# Patient Record
Sex: FEMALE | Race: BLACK OR AFRICAN AMERICAN | Employment: PART TIME | URBAN - METROPOLITAN AREA
[De-identification: names, ages, dates, MRNs, and addresses within clinical notes are randomized per-mention and may not be internally consistent; named-entity substitution may affect disease eponyms.]

---

## 2018-01-11 ENCOUNTER — CLINICAL SUPPORT (OUTPATIENT)
Dept: FAMILY MEDICINE CLINIC | Age: 43
End: 2018-01-11

## 2018-01-11 DIAGNOSIS — E66.9 OBESITY, UNSPECIFIED CLASSIFICATION, UNSPECIFIED OBESITY TYPE, UNSPECIFIED WHETHER SERIOUS COMORBIDITY PRESENT: Primary | ICD-10-CM

## 2018-01-11 NOTE — PROGRESS NOTES
Patient attended a Medically Supervised Weight Loss New Patient Orientation today where we discussed:  - New Direction Very Low Calorie Diet details  - Medical Supervision  - Nutrition education  - Cost  - Policies and compliance required for program enrollment. Patients initial consultation with physician is tentatively scheduled for:  Future Appointments  Date Time Provider Nati Gtz   1/15/2018 8:30 AM AMA LAB AMA ROSALIO GORDON   1/18/2018 2:30 PM Amanda Collins DO [facility] starting weight was documented as: There were no vitals taken for this visit.     The following patient answers were pulled from Weight Loss Questionnaire regarding weight related illness: (refer to media section for full weight loss history)  Hypertension (high blood pressure)  no   High cholesterol no   Hypothyroidism no   Obstructive sleep apnea no   Gout no   Arthritis no ;    Heart Attack in the last 3 months: no     Type I Diabetes no   Type II Diabetes   no

## 2018-01-15 DIAGNOSIS — E66.9 OBESITY (BMI 30-39.9): Primary | ICD-10-CM

## 2018-01-18 ENCOUNTER — OFFICE VISIT (OUTPATIENT)
Dept: FAMILY MEDICINE CLINIC | Age: 43
End: 2018-01-18

## 2018-01-18 VITALS
WEIGHT: 260 LBS | TEMPERATURE: 96.6 F | DIASTOLIC BLOOD PRESSURE: 80 MMHG | SYSTOLIC BLOOD PRESSURE: 121 MMHG | RESPIRATION RATE: 17 BRPM | HEIGHT: 69 IN | HEART RATE: 95 BPM | BODY MASS INDEX: 38.51 KG/M2

## 2018-01-18 DIAGNOSIS — E66.9 OBESITY (BMI 30-39.9): Primary | ICD-10-CM

## 2018-01-18 DIAGNOSIS — K21.9 GASTROESOPHAGEAL REFLUX DISEASE WITHOUT ESOPHAGITIS: ICD-10-CM

## 2018-01-18 NOTE — PATIENT INSTRUCTIONS
Homework for [employer]        Exercise    Exercise daily   - Start slow, gradually increase your time by around 10 to 20 % a week    - To prevent injury, take a recovery week every 4 weeks (reduce your exercise time and intensity by 1/2 during this time)    - Your goal is to work up to 150 min a week; hard enough that you can't whistle or sing. It may take 6 months to work up to this. - Call the Health  at [facility] labs for exercise ideas ([phone])          Common Side Effects   (In order of how common)    -Fatigue  -Hunger  -Constipation  -Low sodium  -Hair Loss      1. Fatigue  Everyone goes through this stage  It's normal  It lasts 10 - 14 days  It goes away  It's your body adjusting to the Ketogenic diet and it's normal       2. Hunger  More common in the first few days  After your body adjusts to the Ketogenic diet it will go away       3. Constipation   -Drink at least 64 oz of non-calorie fluid a day  -Add fiber (at least 20 grams a day)     1. Get the New Direction products with fiber added     2. Use SUGAR-FREE products: Fiber One products, Benefiber or Metamucil    If you're prone to constipation: Take a Stool Softener every day.     (eg - Dulcolax Stool Softener 100 mg or Miralax)    If you get constipated:     1. Start with a Stool Softener (produces a bowel movement in 72 hr):   Examples:      Miralax 1 capful twice a day (It's OK to go up to 3 caps for a few days)      Dulcolax Stool Softener 100 mg       2. Next: If you still have constipation after 2 or 3 days, add a Stimulant          Laxative (this will produce a bowel movement in 6 - 12 hr):   Examples:      Exlax (1 small square) for up to 4 days      Dulcolax stimulant laxative (8.25 mg)       Magnesium citrate pill 500 mg 3 - 4 pills a day       3. Or you can go straight to a combination Stool Softner / Stimulant at night. If  you need, you can add a morning dose.    Examples:      Pericolace 50 mg / 8.25 mg      Sennakot-S  50 mg / 8.25 mg       4. Finally If You're Really locked up, get Liquid Magnesium Citrate (take  according to the directions)      4. Low blood levels of sodium  -Not very common, especially if you're not taking a diuretic (fluid pill)  If you develop a headache, feel fatigued, light-headed or dizzy    Drink 1/2 cup of bouillon broth up to twice a day until you feel normal      5. Hair loss  -This is fairly uncommon; you may see more than a usual amount of hair in your brush or the shower drain  This can happen with any physical stress (surgery, trauma or calorie restriction) or psychological stress. Although is it very concerning, it is often temporary and will resolve within 3 months. Some people notice a benefit from taking a daily dose of Biotin 2,500 mcg and increasing their Fish Oil intake. Books to [address] [place] [address]    1. Change Anything: [employer] of Personal Success  by Stevenson Anderson, Augustin Edmondson, and Miriam Romero    2. Mindless Eating  by Saskia Cline    3. Perfectly Yourself  by Claudia Degroot    4. Me, Myself and I - 28 Days to Creative Self-Love  by Alli Solorzano version only    Note: Reading these books is a small but significant investment in yourself. Merely following the diet will reliably result in weight loss. However, revising how you respond to stressful situations, how you relate to food and, your commitment to self-care (adequate sleep, exercise & daily reflection) is the ONLY way to protect your weight loss and free yourself from your patterns that lead to weight gain. Compliance    We do not expect perfection. However, we do ask for your persistence and your willingness to do the work of growing yourself. You will hit plateaus in your weight loss. You will run into situations that test your will power. Jose Roberto Blood will encounter times when you feel frustrated. It's OK if you slip up from time to time.   However, how your respond to your slip ups and, the adjustments you make to prevent future slip ups will determine you long-term success. If you find yourself temporarily slipping in the program, it's OK. We will gently nudge you forward and encourage you to do the work of identifying and moving beyond your stuck areas. However, if you find yourself wavering in the program for a prolonged time (more than 3 or 4 months) we will ask that you take a break from the program.  At that time you will 3 options:     1. Work with our weight loss specialist AILYN Rodriguez to explore additional weight loss options. 2. Work with a counselor to do some focused work in order to identify and break the patterns that are holding you back. 3.  The combination of both these. Once you, your counselor and/or Jessica feel that you have moved past those patterns and want to give the program another chance, we will gladly work with you to determine if you're ready to start back in the program.    When returning after a break, if it's been less than 3 months, you do not need to repeat an orientation, labs or an EKG. If it's over been 3 months you will required to repeat an orientation and, if greater than 6 months, you will be required to repeat an orientation, labs and an EKG. Additional Tips    - Consume your 4 daily meal replacements equally spaced over the    day   No more than 6 hours between each meal   Breakfast is especially important    - Get the support of family and friends    - Snack-proof your house    - Have strategies for social situations, meetings that run over or vacation      - When you fall off the plan it's no big deal; just start right back. Turn those days into examples of what to change or avoid next time. Long-term Healthy Weight / Body Fat  Different ways to determining your ideal weight:  1. Keep your waist to less than 1/2 of your height   2.  Keep your % body fat to under 30% for female and under 20% if male  3. Keep your BMI around 25          Supplements      1. Vitacost Synergy Basic Multi-Vitamin (Their In-House Brand)      Take 1 capsule twice a day        Found ONLY at [facility], NOT at [facility], [facility], [facility], the Vitamin Shoppe, etc      SKU #: [id number]       $16.49   / 1 month supply      www. Coupons.com  [phone]       2. N-Acetyl Cysteine (NAC) -- 600 mg       1 pill once a day       Found at many stores but Tayla Alejo has a good bermudez:       Item #: [id number]  $9.99 / 120 pills (4 month supply)       www. Coupons.com  [phone]      3. Turmeric with Black Pepper Extract (or Bioperine) 500 mg      1 pill 1 - 2 times a day (more is joint pain or increased inflammation)      Found at many stores but Vitacost has a good price:      SKU #: [id number]  $13.64 / 60 pills      www. Coupons.com  [phone]       4. Probiotic: 15-35 (35 billion CFU)         1 capsule twice a day for 2 weeks, then 3 days a week       SKU #: [id number]  $27.99 / 120 capsules       www. Coupons.com  [phone]       5. Fish Oil      Take 1 capsule daily                             FYI:   Typical fish oil per pill =  mg and  mg  Krill oil per pill = EPA 50 - 70 mg and DHA 27 - 250 mg    6. Other things to help:      [address] you with self acceptance         Rescue Remedy - Helps you with an overactive mind         Theanine Serine by [employer] - 1 pill in the morning and 1 pill 30 min before bed           Put 4 drops in 10 oz water or a meal replacement 2 - 4 times a day         Around $15 a vial which lasts ~3 months         www. Coupons.com  [phone]                Drink Filtered Water    My favorite water filter (adds minerals to your water and cheaper than Shantel)    pH REFRESH Alkaline Water Pitcher Ionizer With 2 Long-Life Filters - Alkaline  Machine - Ionized Water Alkalizer Filter, 84oz, 2.5L (1949 Model) (White)   Sold on [employer] w/ Free Shipping        ^^^^^^^^^^^^^^^^^^^^^^^^^^^^^^^^^^^^^^^^^^^^^^^^^^^^^^^^^^^^^^^^^^^^^^^^^^^^^^^^^^^^^^^^^^^^^^^      Carbohydrates     If you do not metabolize carbohydrates well, you will lose weight and keep the weigh off by keeping your carbohydrate intake low. How do you know if you do not metabolize carbs well? If your Triglycerides, sdLCL-C and Insulin Resistance are elevated, then you will do well to follow a low carb diet. Fun Facts About Carbs    - The Typical American Diet = 450 grams a day    - The Reducing Phase of the VLCD = 40 grams a day    - Target for weight loss maintenance:   - Eat no more than 30 grams per meal   - Eat no more than 10 grams per snack (mid-morning and mid-afternoon snack)        Resources for finding out where carbs hide in our foods:  1. Our Registered Dietitians    2. Www. Atkins. com/tools    3. Read food labels    4. Books       - The Calorie Loc Herb       - The Isael System Diet for a New You       - Shayna Mandujano's NEW Carb and Calorie 4th Edition (my favorite)    5. Smart phone and Internet-based apps       - [employer]        - Carb Manager      If you want to get a better picture of your cardiac risk, consider getting a coronary calcium score:  Call [phone] to schedule one. Body Mass Index: Care Instructions  Your Care Instructions    Body mass index (BMI) can help you see if your weight is raising your risk for health problems. It uses a formula to compare how much you weigh with how tall you are. · A BMI lower than 18.5 is considered underweight. · A BMI between 18.5 and 24.9 is considered healthy. · A BMI between 25 and 29.9 is considered overweight. A BMI of 30 or higher is considered obese. If your BMI is in the normal range, it means that you have a lower risk for weight-related health problems.  If your BMI is in the overweight or obese range, you may be at increased risk for weight-related health problems, such as high blood pressure, heart disease, stroke, arthritis or joint pain, and diabetes. If your BMI is in the underweight range, you may be at increased risk for health problems such as fatigue, lower protection (immunity) against illness, muscle loss, bone loss, hair loss, and hormone problems. BMI is just one measure of your risk for weight-related health problems. You may be at higher risk for health problems if you are not active, you eat an unhealthy diet, or you drink too much alcohol or use tobacco products. Follow-up care is a key part of your treatment and safety. Be sure to make and go to all appointments, and call your doctor if you are having problems. It's also a good idea to know your test results and keep a list of the medicines you take. How can you care for yourself at home? · Practice healthy eating habits. This includes eating plenty of fruits, vegetables, whole grains, lean protein, and low-fat dairy. · If your doctor recommends it, get more exercise. Walking is a good choice. Bit by bit, increase the amount you walk every day. Try for at least 30 minutes on most days of the week. · Do not smoke. Smoking can increase your risk for health problems. If you need help quitting, talk to your doctor about stop-smoking programs and medicines. These can increase your chances of quitting for good. · Limit alcohol to 2 drinks a day for men and 1 drink a day for women. Too much alcohol can cause health problems. If you have a BMI higher than 25  · Your doctor may do other tests to check your risk for weight-related health problems. This may include measuring the distance around your waist. A waist measurement of more than 40 inches in men or 35 inches in women can increase the risk of weight-related health problems. · Talk with your doctor about steps you can take to stay healthy or improve your health.  You may need to make lifestyle changes to lose weight and stay healthy, such as changing your diet and getting regular exercise. If you have a BMI lower than 18.5  · Your doctor may do other tests to check your risk for health problems. · Talk with your doctor about steps you can take to stay healthy or improve your health. You may need to make lifestyle changes to gain or maintain weight and stay healthy, such as getting more healthy foods in your diet and doing exercises to build muscle. Where can you learn more? Go to http://matthew-ashley.info/. Enter S176 in the search box to learn more about \"Body Mass Index: Care Instructions. \"  Current as of: October 13, 2016  Content Version: 11.4  © 3357-5657 [employer]. Care instructions adapted under license by [employer] (which disclaims liability or warranty for this information). If you have questions about a medical condition or this instruction, always ask your healthcare professional. [address] any warranty or liability for your use of this information.

## 2018-01-18 NOTE — PROGRESS NOTES
[facility] Weight Loss Program Progress Note: Initial Physician Visit     Aleksey Richter is a 38 y.o. female who is here for medical screening for entering the [facility] Loss Program.     CC: Obesity      Weight History  I personally reviewed the Medical Screening Evelene Fey completed by patient and scanned into media section of chart. It includes duration of their obesity, maximum weight, goal weight and weight gain time line (timing), all of which give the context of their obesity AND a Family History of their obesity. Is their Weight Loss Goal entered in to Comments? Yes 185lb    Weight loss History  I personally reviewed the Medical Screening Evelene Fey completed by the patient and scanned into media section of chart. It includes number of weight loss attempts, the weight loss program that patients was most successful using, and if they have any hx of anorectic medication use, including OTC supplements. This captures modifying factors. Significant Medical History  Past Medical History:   Diagnosis Date    GERD (gastroesophageal reflux disease)        I personally reviewed the Medical Screening Evelene Fey completed by the patient and scanned into media section of chart. This allows me to assess associated symptoms that are significant in the assessment of the patient's obesity and the patient's Past Medical History. Outpatient Prescriptions Marked as Taking for the 1/18/18 encounter (Office Visit) with Mau Manjeet, DO   Medication Sig Dispense Refill    MULTIVITAMINS WITH FLUORIDE (MULTI-VITAMIN PO) Take  by mouth.            Significant Psychosocial History   Has a doctor every diagnosed with Binge Eating Disorder, Bulemia or Anorexia? : no     Compliance  Upcoming Travel? no    Social History  Social History   Substance Use Topics    Smoking status: Never Smoker    Smokeless tobacco: Never Used    Alcohol use Yes      Comment: Occaisionally       Exercise  I personally reviewed the Medical Screening Sacha Rodgers completed by the patient and scanned into media section of chart. Review of Systems  See HPI        Objective  Visit Vitals    Resp 17    Ht 5' 9\" (1.753 m)    Wt 260 lb (117.9 kg)    BMI 38.4 kg/m2         Weight Metrics 1/18/2018 1/18/2018   Weight - 260 lb   Body Fat % 38 -   BMI - 38.4 kg/m2       Physical Exam  Vital Signs Reviewed  Weight Management Metrics Reviewed    Appearance: Obesity  HEENT:  Scleral icterus?  no  Neck:  Thyromegaly or nodules? no  Mouth: Large tongue no  Heart:  RRR  Lungs:  LCTA-B  Abdomen:     Hepatomegaly? no   Striae present? no  Skin:    Acne?  no   Hirsutism? no   Skin tags? no   Acanthosis Nigricans?  no  Ext:  Edema? no      Assessment & Plan  No diagnosis found. 1. Labs reviewed w/ patient    2. EKG reviewed w/ patient:  Personally review by me:  Sinus  Rhythm   Low voltage in precordial leads. -RSR(V1) -nondiagnostic. ABNORMAL    QTc = 405 sec (Upper limit is 440 for males & 460 for females)    3. Medication changes include: None    4. Based on his history, labs and EKG, Nicola Edwards is now enrolled for the [facility] Program.     5.  Individualized Patient Plan is as follows:   Diet Regimen: 4 Meal Replacements daily. Weigh in: weekly at [facility]   BP f/up plan: weekly at [facility]       25 min of the 45 minutes face to face time with Ambreen Fiorella consisted of counseling & coordinating and/or discussing treatment plans in reference to her above mentioned diagnoses.

## 2018-01-18 NOTE — MR AVS SNAPSHOT
Hair Moraes [place] [address] [facility] [place]. [address] [facility] [id number] 
[phone] Patient: Dalia Stafford MRN: [id number] :1975 Visit Information Date & Time Provider Department Dept. Phone Encounter #  
 2018  2:30 PM Colton Raymundo, [address] [place] [address],[address] [phone] [id number] Follow-up Instructions Return in about 6 weeks (around 3/1/2018). Upcoming Health Maintenance Date Due DTaP/Tdap/Td series (1 - Tdap) 1998 PAP AKA CERVICAL CYTOLOGY 3/12/2015 Influenza Age 5 to Adult 2017 Allergies as of 2018  Review Complete On: 2018 By: Colton Raymundo DO No Known Allergies Current Immunizations  Reviewed on 2018 No immunizations on file. Reviewed by Femi Ewing LPN on  at  2:57 PM  
You Were Diagnosed With   
  
 Codes Comments Obesity (BMI 30-39.9)    -  Primary ICD-10-CM: X82.3 ICD-9-CM: 278.00 Gastroesophageal reflux disease without esophagitis     ICD-10-CM: K21.9 ICD-9-CM: 530.81 Vitals BP Pulse Temp Resp Height(growth percentile) Weight(growth percentile) 121/80 95 96.6 °F (35.9 °C) (Oral) 17 5' 9\" (1.753 m) 260 lb (117.9 kg) BMI OB Status Smoking Status 38.4 kg/m2 IUD Never Smoker Vitals History BMI and BSA Data Body Mass Index Body Surface Area  
 38.4 kg/m 2 2.4 m 2 Your Updated Medication List  
  
   
This list is accurate as of: 18  3:55 PM.  Always use your most recent med list.  
  
  
  
  
 MULTI-VITAMIN PO Take  by mouth. We Performed the Following AMB POC EKG ROUTINE W/ 12 LEADS, INTER & REP [00298 CPT(R)] Follow-up Instructions Return in about 6 weeks (around 3/1/2018). Patient Instructions Homework for [employer] 
 
 
 
Exercise Exercise daily  
- Start slow, gradually increase your time by around 10 to 20 % a week - To prevent injury, take a recovery week every 4 weeks (reduce your exercise time and intensity by 1/2 during this time) - Your goal is to work up to 150 min a week; hard enough that you can't whistle or sing. It may take 6 months to work up to this. - Call the Health  at [facility] labs for exercise ideas ([phone]) Common Side Effects (In order of how common) -Fatigue 
-Hunger 
-Constipation 
-Low sodium 
-Hair Loss 1. Fatigue Everyone goes through this stage It's normal 
It lasts 10 - 14 days It goes away It's your body adjusting to the Ketogenic diet and it's normal  
 
 
2. Hunger More common in the first few days After your body adjusts to the Ketogenic diet it will go away 3. Constipation  
-Drink at least 64 oz of non-calorie fluid a day 
-Add fiber (at least 20 grams a day) 1. Get the New Direction products with fiber added 2. Use SUGAR-FREE products: Fiber One products, Benefiber or Metamucil If you're prone to constipation: Take a Stool Softener every day. 
   (eg - Dulcolax Stool Softener 100 mg or Miralax) If you get constipated: 1. Start with a Stool Softener (produces a bowel movement in 72 hr): 
 Examples: 
    Miralax 1 capful twice a day (It's OK to go up to 3 caps for a few days) Dulcolax Stool Softener 100 mg 2. Next: If you still have constipation after 2 or 3 days, add a Stimulant Laxative (this will produce a bowel movement in 6 - 12 hr): 
 Examples: 
    Exlax (1 small square) for up to 4 days Dulcolax stimulant laxative (8.25 mg) Magnesium citrate pill 500 mg 3 - 4 pills a day 3. Or you can go straight to a combination Stool Softner / Stimulant at night. If  you need, you can add a morning dose. Examples: 
    Pericolace 50 mg / 8.25 mg Sennakot-S  50 mg / 8.25 mg 
 
   4.   Finally If You're Really locked up, get Liquid Magnesium Citrate (take  according to the directions) 4. Low blood levels of sodium 
-Not very common, especially if you're not taking a diuretic (fluid pill) If you develop a headache, feel fatigued, light-headed or dizzy Drink 1/2 cup of bouillon broth up to twice a day until you feel normal 
 
 
5. Hair loss 
-This is fairly uncommon; you may see more than a usual amount of hair in your brush or the shower drain This can happen with any physical stress (surgery, trauma or calorie restriction) or psychological stress. Although is it very concerning, it is often temporary and will resolve within 3 months. Some people notice a benefit from taking a daily dose of Biotin 2,500 mcg and increasing their Fish Oil intake. Books to [address] [place] [address] 1. Change Anything: The [employer] of Personal Success 
by Zaira Shannon, Ledy Alamo, and Ni Jacobo 2. Mindless Eating 
by Che Germain 3. Perfectly Yourself 
by Tressa Live 4. Me, Myself and I - 28 Days to Creative Self-Love 
by Uri Alvarez version only Note: Reading these books is a small but significant investment in yourself. Merely following the diet will reliably result in weight loss. However, revising how you respond to stressful situations, how you relate to food and, your commitment to self-care (adequate sleep, exercise & daily reflection) is the ONLY way to protect your weight loss and free yourself from your patterns that lead to weight gain. Compliance We do not expect perfection. However, we do ask for your persistence and your willingness to do the work of growing yourself. You will hit plateaus in your weight loss. You will run into situations that test your will power. Duran Bowles will encounter times when you feel frustrated. It's OK if you slip up from time to time. However, how your respond to your slip ups and, the adjustments you make to prevent future slip ups will determine you long-term success. If you find yourself temporarily slipping in the program, it's OK. We will gently nudge you forward and encourage you to do the work of identifying and moving beyond your stuck areas. However, if you find yourself wavering in the program for a prolonged time (more than 3 or 4 months) we will ask that you take a break from the program.  At that time you will 3 options:  
 
1. Work with our weight loss specialist [facility] to explore additional weight loss options. 2. Work with a counselor to do some focused work in order to identify and break the patterns that are holding you back. 3.  The combination of both these. Once you, your counselor and/or Jessica feel that you have moved past those patterns and want to give the program another chance, we will gladly work with you to determine if you're ready to start back in the program. 
 
When returning after a break, if it's been less than 3 months, you do not need to repeat an orientation, labs or an EKG. If it's over been 3 months you will required to repeat an orientation and, if greater than 6 months, you will be required to repeat an orientation, labs and an EKG. Additional Tips 
 
- Consume your 4 daily meal replacements equally spaced over the    day No more than 6 hours between each meal 
 Breakfast is especially important - Get the support of family and friends 
 
- Snack-proof your house - Have strategies for social situations, meetings that run over or vacation - When you fall off the plan it's no big deal; just start right back. Turn those days into examples of what to change or avoid next time. Long-term Healthy Weight / Body Fat Different ways to determining your ideal weight: 
1. Keep your waist to less than 1/2 of your height 2. Keep your % body fat to under 30% for female and under 20% if male 3. Keep your BMI around 25 Supplements 1.  Vitacost Synergy Basic Multi-Vitamin (Their In-House Brand) Take 1 capsule twice a day Found ONLY at [facility], NOT at [facility], [facility], the Vitamin Shoppe, etc 
    SKU #: [id number]  
    $16.49   / 1 month supply 
    www. Dheere Bolo  [phone]  
 
 
2. N-Acetyl Cysteine (NAC) -- 600 mg 
     1 pill once a day Found at many stores but [address] has a good price: 
     Item #: [id number]  $9.99 / 120 pills (4 month supply) 
     www. Dheere Bolo  [phone] 3. Turmeric with Black Pepper Extract (or Bioperine) 500 mg 
    1 pill 1 - 2 times a day (more is joint pain or increased inflammation) Found at many stores but [address] has a good price: 
    SKU #: [id number]  $13.64 / 61 pills 
    www. Dheere Bolo  [phone] 4. Probiotic: 15-35 (35 billion CFU) 1 capsule twice a day for 2 weeks, then 3 days a week SKU #: [id number]  $27.99 / 120 capsules 
     www. Dheere Bolo  [phone] 5. Fish Oil Take 1 capsule daily FYI:  
Typical fish oil per pill =  mg and  mg 
Krill oil per pill = EPA 50 - 70 mg and DHA 27 - 250 mg 
 
6. Other things to help: 
    -Ilan Hernandezas Crab Apple - Helps you with self acceptance Rescue Remedy - Helps you with an overactive mind Theanine Serine by [employer]atur[employer] - 1 pill in the morning and 1 pill 30 min before bed Put 4 drops in 10 oz water or a meal replacement 2 - 4 times a day Around $15 a vial which lasts ~3 months 
       www. Dheere Bolo  [phone] Drink Filtered Water My favorite water filter (adds minerals to your water and cheaper than Shantel) pH REFRESH Alkaline Water Pitcher Ionizer With 2 Long-Life Filters - Alkaline  Machine - Ionized Water Alkalizer Filter, 84oz, 2.5L (2017 Model) Hernández Lauraside) Sold on OVGuide/ Quietyme ^^^^^^^^^^^^^^^^^^^^^^^^^^^^^^^^^^^^^^^^^^^^^^^^^^^^^^^^^^^^^^^^^^^^^^^^^^^^^^^^^^^^^^^^^^^^^^^ Carbohydrates If you do not metabolize carbohydrates well, you will lose weight and keep the weigh off by keeping your carbohydrate intake low. How do you know if you do not metabolize carbs well? If your Triglycerides, sdLCL-C and Insulin Resistance are elevated, then you will do well to follow a low carb diet. Fun Facts About Carbs - The Typical American Diet = 450 grams a day - The Reducing Phase of the VLCD = 40 grams a day - Target for weight loss maintenance: 
 - Eat no more than 30 grams per meal 
 - Eat no more than 10 grams per snack (mid-morning and mid-afternoon snack) Resources for finding out where carbs hide in our foods: 
1. Our Registered Dietitians 2. Www. Atkins. com/tools 3. Read food labels 4. Books - The Calorie Simeon Funk - The New Atkins Diet for a New You 
     - Shayna Mandujano's NEW Carb and Calorie 4th Edition (my favorite) 5. Smart phone and Internet-based apps - AblynxPal  
     - Carb Manager If you want to get a better picture of your cardiac risk, consider getting a coronary calcium score:  Call [phone] to schedule one. Body Mass Index: Care Instructions Your Care Instructions Body mass index (BMI) can help you see if your weight is raising your risk for health problems. It uses a formula to compare how much you weigh with how tall you are. · A BMI lower than 18.5 is considered underweight. · A BMI between 18.5 and 24.9 is considered healthy. · A BMI between 25 and 29.9 is considered overweight. A BMI of 30 or higher is considered obese. If your BMI is in the normal range, it means that you have a lower risk for weight-related health problems.  If your BMI is in the overweight or obese range, you may be at increased risk for weight-related health problems, such as high blood pressure, heart disease, stroke, arthritis or joint pain, and diabetes. If your BMI is in the underweight range, you may be at increased risk for health problems such as fatigue, lower protection (immunity) against illness, muscle loss, bone loss, hair loss, and hormone problems. BMI is just one measure of your risk for weight-related health problems. You may be at higher risk for health problems if you are not active, you eat an unhealthy diet, or you drink too much alcohol or use tobacco products. Follow-up care is a key part of your treatment and safety. Be sure to make and go to all appointments, and call your doctor if you are having problems. It's also a good idea to know your test results and keep a list of the medicines you take. How can you care for yourself at home? · Practice healthy eating habits. This includes eating plenty of fruits, vegetables, whole grains, lean protein, and low-fat dairy. · If your doctor recommends it, get more exercise. Walking is a good choice. Bit by bit, increase the amount you walk every day. Try for at least 30 minutes on most days of the week. · Do not smoke. Smoking can increase your risk for health problems. If you need help quitting, talk to your doctor about stop-smoking programs and medicines. These can increase your chances of quitting for good. · Limit alcohol to 2 drinks a day for men and 1 drink a day for women. Too much alcohol can cause health problems. If you have a BMI higher than 25 · Your doctor may do other tests to check your risk for weight-related health problems. This may include measuring the distance around your waist. A waist measurement of more than 40 inches in men or 35 inches in women can increase the risk of weight-related health problems. · Talk with your doctor about steps you can take to stay healthy or improve your health.  You may need to make lifestyle changes to lose weight and stay healthy, such as changing your diet and getting regular exercise. If you have a BMI lower than 18.5 · Your doctor may do other tests to check your risk for health problems. · Talk with your doctor about steps you can take to stay healthy or improve your health. You may need to make lifestyle changes to gain or maintain weight and stay healthy, such as getting more healthy foods in your diet and doing exercises to build muscle. Where can you learn more? Go to http://matthew-ashley.info/. Enter S176 in the search box to learn more about \"Body Mass Index: Care Instructions. \" Current as of: October 13, 2016 Content Version: 11.4 © 0554-2969 [employer]. Care instructions adapted under license by [employer] (which disclaims liability or warranty for this information). If you have questions about a medical condition or this instruction, always ask your healthcare professional. [address] any warranty or liability for your use of this information. Introducing [facility] [place] [facility]! Leeanne Duran introduces [employer] patient portal. Now you can access parts of your medical record, email your doctor's office, and request medication refills online. 1. In your internet browser, go to [email or web address] 2. Click on the First Time User? Click Here link in the Sign In box. You will see the New Member Sign Up page. 3. Enter your [employer] Access Code exactly as it appears below. You will not need to use this code after youve completed the sign-up process. If you do not sign up before the expiration date, you must request a new code. · [employer] Access Code: [email or web address] Expires: 4/18/2018  3:44 PM 
 
4. Enter the last four digits of your Social Security Number (xxxx) and Date of Birth (mm/dd/yyyy) as indicated and click Submit. You will be taken to the next sign-up page. 5. Create a [employer] ID. This will be your [employer] login ID and cannot be changed, so think of one that is secure and easy to remember. 6. Create a [employer] password. You can change your password at any time. 7. Enter your Password Reset Question and Answer. This can be used at a later time if you forget your password. 8. Enter your e-mail address. You will receive e-mail notification when new information is available in [address] 9. Click Sign Up. You can now view and download portions of your medical record. 10. Click the Download Summary menu link to download a portable copy of your medical information. If you have questions, please visit the Frequently Asked Questions section of the [employer] website. Remember, [employer] is NOT to be used for urgent needs. For medical emergencies, dial 911. Now available from your iPhone and Android! Please provide this summary of care documentation to your next provider. If you have any questions after today's visit, please call [phone].

## 2018-01-25 ENCOUNTER — CLINICAL SUPPORT (OUTPATIENT)
Dept: FAMILY MEDICINE CLINIC | Age: 43
End: 2018-01-25

## 2018-01-25 VITALS
HEART RATE: 81 BPM | HEIGHT: 69 IN | SYSTOLIC BLOOD PRESSURE: 127 MMHG | WEIGHT: 259.6 LBS | DIASTOLIC BLOOD PRESSURE: 82 MMHG | BODY MASS INDEX: 38.45 KG/M2

## 2018-01-25 DIAGNOSIS — E66.9 OBESITY (BMI 30-39.9): Primary | ICD-10-CM

## 2018-01-25 NOTE — PROGRESS NOTES
Progress Note: Weekly Education Class in the [facility] Weight Loss Program   Is there anything that you or the patient needs to let Dr Ariane Huitron know about? no  Over the past week, have you experienced any side-effects? no    Nicola Edwards is a 38 y.o. female who is enrolled in [facility] Weight Loss Program    Visit Vitals    /82    Pulse 81    Ht 5' 9\" (1.753 m)    Wt 259 lb 9.6 oz (117.8 kg)    BMI 38.34 kg/m2     Weight Metrics 1/25/2018 1/18/2018 1/18/2018   Weight 259 lb 9.6 oz - 260 lb   Waist Measure Inches 39.5 - -   Body Fat % 38 38 -   BMI 38.34 kg/m2 - 38.4 kg/m2         Have you received any other medical care this week? no  If yes, where and for what? Have you had any change in your medications since your last visit? no  If yes what? Did you have any problems adhering to the program last week? no  If yes, please explain:       Eating Habits Over Last Week:  Did you take in 64 oz of non-caloric fluids? yes     Did you consume your 4 meal replacements each day?  yes       Physical Activity Over the Past Week:    Aerobic exercise: 0 min  Resistance exercise: 0 workouts / week

## 2018-02-01 ENCOUNTER — CLINICAL SUPPORT (OUTPATIENT)
Dept: FAMILY MEDICINE CLINIC | Age: 43
End: 2018-02-01

## 2018-02-01 VITALS
BODY MASS INDEX: 37.86 KG/M2 | DIASTOLIC BLOOD PRESSURE: 74 MMHG | HEART RATE: 64 BPM | HEIGHT: 69 IN | SYSTOLIC BLOOD PRESSURE: 112 MMHG | WEIGHT: 255.6 LBS

## 2018-02-01 DIAGNOSIS — E66.9 OBESITY (BMI 30-39.9): Primary | ICD-10-CM

## 2018-02-01 NOTE — PROGRESS NOTES
Progress Note: Weekly Education Class in the [facility] Weight Loss Program   Is there anything that you or the patient needs to let Dr Elinore Kehr know about? no  Over the past week, have you experienced any side-effects? no    Solomon Montana is a 38 y.o. female who is enrolled in [facility] Weight Loss Program    Visit Vitals    /74    Pulse 64    Ht 5' 9\" (1.753 m)    Wt 255 lb 9.6 oz (115.9 kg)    BMI 37.75 kg/m2     Weight Metrics 2/1/2018 1/25/2018 1/18/2018 1/18/2018   Weight 255 lb 9.6 oz 259 lb 9.6 oz - 260 lb   Waist Measure Inches 42 39.5 - -   Body Fat % 37 38 38 -   BMI 37.75 kg/m2 38.34 kg/m2 - 38.4 kg/m2         Have you received any other medical care this week? no  If yes, where and for what? Have you had any change in your medications since your last visit? no  If yes what? Did you have any problems adhering to the program last week? no  If yes, please explain:       Eating Habits Over Last Week:  Did you take in 64 oz of non-caloric fluids? yes     Did you consume your 4 meal replacements each day?  yes       Physical Activity Over the Past Week:    Aerobic exercise: 90 min  Resistance exercise: 0 workouts / week

## 2018-02-08 ENCOUNTER — CLINICAL SUPPORT (OUTPATIENT)
Dept: FAMILY MEDICINE CLINIC | Age: 43
End: 2018-02-08

## 2018-02-08 VITALS
BODY MASS INDEX: 37.26 KG/M2 | HEIGHT: 69 IN | DIASTOLIC BLOOD PRESSURE: 83 MMHG | SYSTOLIC BLOOD PRESSURE: 107 MMHG | WEIGHT: 251.6 LBS | HEART RATE: 90 BPM

## 2018-02-08 DIAGNOSIS — E66.9 OBESITY (BMI 30-39.9): Primary | ICD-10-CM

## 2018-02-08 NOTE — PROGRESS NOTES
Progress Note: Weekly Education Class in the [facility] Weight Loss Program   Is there anything that you or the patient needs to let Dr Richmond Fong know about? no  Over the past week, have you experienced any side-effects? no    Betzaida Dukes is a 38 y.o. female who is enrolled in [facility] Weight Loss Program    Visit Vitals    /83    Pulse 90    Ht 5' 9\" (1.753 m)    Wt 251 lb 9.6 oz (114.1 kg)    BMI 37.15 kg/m2     Weight Metrics 2/8/2018 2/1/2018 1/25/2018 1/18/2018 1/18/2018   Weight 251 lb 9.6 oz 255 lb 9.6 oz 259 lb 9.6 oz - 260 lb   Waist Measure Inches 40 42 39.5 - -   Body Fat % 36.9 37 38 38 -   BMI 37.15 kg/m2 37.75 kg/m2 38.34 kg/m2 - 38.4 kg/m2         Have you received any other medical care this week? no  If yes, where and for what? Have you had any change in your medications since your last visit? no  If yes what? Did you have any problems adhering to the program last week? no  If yes, please explain:       Eating Habits Over Last Week:  Did you take in 64 oz of non-caloric fluids? yes     Did you consume your 4 meal replacements each day?  yes       Physical Activity Over the Past Week:    Aerobic exercise: 90min  Resistance exercise: 0 workouts / week

## 2018-02-09 DIAGNOSIS — E66.9 OBESITY (BMI 30-39.9): ICD-10-CM

## 2018-02-16 ENCOUNTER — CLINICAL SUPPORT (OUTPATIENT)
Dept: FAMILY MEDICINE CLINIC | Age: 43
End: 2018-02-16

## 2018-02-16 VITALS
HEART RATE: 76 BPM | WEIGHT: 248.8 LBS | SYSTOLIC BLOOD PRESSURE: 113 MMHG | DIASTOLIC BLOOD PRESSURE: 73 MMHG | BODY MASS INDEX: 36.85 KG/M2 | HEIGHT: 69 IN

## 2018-02-16 DIAGNOSIS — E66.9 OBESITY (BMI 30-39.9): Primary | ICD-10-CM

## 2018-02-16 NOTE — PROGRESS NOTES
Progress Note: Weekly Education Class in the [facility] Weight Loss Program   Is there anything that you or the patient needs to let Dr Maria Antonia Loredo know about? no  Over the past week, have you experienced any side-effects? no    Shane Harper is a 38 y.o. female who is enrolled in [facility] Weight Loss Program    Visit Vitals    /73    Pulse 76    Ht 5' 9\" (1.753 m)    Wt 248 lb 12.8 oz (112.9 kg)    BMI 36.74 kg/m2     Weight Metrics 2/16/2018 2/8/2018 2/1/2018 1/25/2018 1/18/2018 1/18/2018   Weight 248 lb 12.8 oz 251 lb 9.6 oz 255 lb 9.6 oz 259 lb 9.6 oz - 260 lb   Waist Measure Inches 38 40 42 39.5 - -   Body Fat % 36 36.9 37 38 38 -   BMI 36.74 kg/m2 37.15 kg/m2 37.75 kg/m2 38.34 kg/m2 - 38.4 kg/m2         Have you received any other medical care this week? no  If yes, where and for what? Have you had any change in your medications since your last visit? no  If yes what? Did you have any problems adhering to the program last week? no  If yes, please explain:       Eating Habits Over Last Week:  Did you take in 64 oz of non-caloric fluids? yes     Did you consume your 4 meal replacements each day?  yes       Physical Activity Over the Past Week:    Aerobic exercise: 0 min  Resistance exercise: 0 workouts / week

## 2018-02-22 ENCOUNTER — CLINICAL SUPPORT (OUTPATIENT)
Dept: FAMILY MEDICINE CLINIC | Age: 43
End: 2018-02-22

## 2018-02-22 VITALS
SYSTOLIC BLOOD PRESSURE: 116 MMHG | DIASTOLIC BLOOD PRESSURE: 62 MMHG | HEART RATE: 61 BPM | BODY MASS INDEX: 36.85 KG/M2 | WEIGHT: 248.8 LBS | HEIGHT: 69 IN

## 2018-02-22 DIAGNOSIS — E66.9 OBESITY (BMI 30-39.9): Primary | ICD-10-CM

## 2018-02-22 NOTE — PROGRESS NOTES
Progress Note: Weekly Education Class in the [facility] Weight Loss Program   Is there anything that you or the patient needs to let Dr Dax Rowe know about? no  Over the past week, have you experienced any side-effects? no    Karo Yan is a 38 y.o. female who is enrolled in [facility] Weight Loss Program    Visit Vitals    /62    Pulse 61    Ht 5' 9\" (1.753 m)    Wt 248 lb 12.8 oz (112.9 kg)    BMI 36.74 kg/m2     Weight Metrics 2/22/2018 2/16/2018 2/8/2018 2/1/2018 1/25/2018 1/18/2018 1/18/2018   Weight 248 lb 12.8 oz 248 lb 12.8 oz 251 lb 9.6 oz 255 lb 9.6 oz 259 lb 9.6 oz - 260 lb   Waist Measure Inches 39 38 40 42 39.5 - -   Body Fat % 36 36 36.9 37 38 38 -   BMI 36.74 kg/m2 36.74 kg/m2 37.15 kg/m2 37.75 kg/m2 38.34 kg/m2 - 38.4 kg/m2         Have you received any other medical care this week? no  If yes, where and for what? Have you had any change in your medications since your last visit? no  If yes what? Did you have any problems adhering to the program last week? no  If yes, please explain:       Eating Habits Over Last Week:  Did you take in 64 oz of non-caloric fluids? yes     Did you consume your 4 meal replacements each day?  yes       Physical Activity Over the Past Week:    Aerobic exercise: 0 min  Resistance exercise: 0 workouts / week'

## 2018-03-01 ENCOUNTER — OFFICE VISIT (OUTPATIENT)
Dept: FAMILY MEDICINE CLINIC | Age: 43
End: 2018-03-01

## 2018-03-01 VITALS
HEART RATE: 77 BPM | DIASTOLIC BLOOD PRESSURE: 74 MMHG | BODY MASS INDEX: 37.31 KG/M2 | HEIGHT: 69 IN | WEIGHT: 251.9 LBS | RESPIRATION RATE: 16 BRPM | TEMPERATURE: 98.1 F | SYSTOLIC BLOOD PRESSURE: 110 MMHG

## 2018-03-01 DIAGNOSIS — K21.9 GASTROESOPHAGEAL REFLUX DISEASE WITHOUT ESOPHAGITIS: ICD-10-CM

## 2018-03-01 DIAGNOSIS — E66.9 OBESITY (BMI 30-39.9): Primary | ICD-10-CM

## 2018-03-01 DIAGNOSIS — E66.9 OBESITY (BMI 30-39.9): ICD-10-CM

## 2018-03-01 NOTE — PROGRESS NOTES
1. Have you been to the ER, urgent care clinic since your last visit? Hospitalized since your last visit? No    2. Have you seen or consulted any other health care providers outside of the [address] [place] [address] since your last visit? Include any pap smears or colon screening. No      [facility] Progress Note:   F/up Physician Visit for the [facility] / LCD Program    CC: Weight Management    Mone Mejia is a 38 y.o. female who is here for her f/up physician visit for the [facility].    Weight History  Ideal body weight: 145 lb 15.1 oz (66.2 kg)  Adjusted ideal body weight: 188 lb 5.2 oz (85.4 kg) (Based on [employer] Tables)    Wt Readings from Last 10 Encounters:   03/01/18 251 lb 14.4 oz (114.3 kg)   02/22/18 248 lb 12.8 oz (112.9 kg)   02/16/18 248 lb 12.8 oz (112.9 kg)   02/08/18 251 lb 9.6 oz (114.1 kg)   02/01/18 255 lb 9.6 oz (115.9 kg)   01/25/18 259 lb 9.6 oz (117.8 kg)   01/18/18 260 lb (117.9 kg)     Wt down 9 lb in 6 weeks. Weight Metrics 3/1/2018 3/1/2018 2/22/2018 2/16/2018 2/8/2018 2/1/2018 1/25/2018   Weight - 251 lb 14.4 oz 248 lb 12.8 oz 248 lb 12.8 oz 251 lb 9.6 oz 255 lb 9.6 oz 259 lb 9.6 oz   Waist Measure Inches - - 39 38 40 42 39.5   Body Fat % 42 - 36 36 36.9 37 38   BMI - 37.2 kg/m2 36.74 kg/m2 36.74 kg/m2 37.15 kg/m2 37.75 kg/m2 38.34 kg/m2         Medications Currently Taking  Current Outpatient Prescriptions   Medication Sig Dispense Refill    MULTIVITAMINS WITH FLUORIDE (MULTI-VITAMIN PO) Take  by mouth. Participation   Have you been attending class on a regular basis? yes    Review of Systems  Since your last visit, have you experienced any complications? no  Are you taking an appetite suppressant? no    BP Readings from Last 3 Encounters:   03/01/18 110/74   02/22/18 116/62   02/16/18 113/73     Have you received any other medical care this week? no  If yes, where and for what?      Have you discontinued or changed any medicine or dose of your medicine(s) since your last visit with Dr Emery Calderon? no        Diet  How many ounces of calorie-free liquids did you consume each day?  96 oz  How many meal replacements did you take each day? 3-4  Did you have any problems adhering to the program?  no     Exercise  Aerobic exercise: 180 min  Resistance exercise: 0 workouts / week  Any discomfort?  no        Objective  Visit Vitals    /74    Pulse 77    Temp 98.1 °F (36.7 °C) (Oral)    Resp 16    Ht 5' 9\" (1.753 m)    Wt 251 lb 14.4 oz (114.3 kg)    BMI 37.2 kg/m2     No LMP recorded. Patient is not currently having periods (Reason: IUD). Physical Exam  Appearance: Obese, A&O x 3, NAD  HEENT:  NC/AT, EOMI, PERRL, No scleral icterus    Assessment / Plan    Encounter Diagnoses   Name Primary?  Obesity (BMI 30-39. 9) Yes    Gastroesophageal reflux disease without esophagitis        1. Weight management    improved   Progress was reviewed with patient   Goal(s) for next appointment:   See patient instructions          2. Labs    Latest results reviewed with patient   Routine monitoring labs ordered yes    -Lab slip given to pt for off-site [facility] labs no    10 minutes of the 15 minutes face to face time with Savita Sifuentes consisted of counseling & coordinating and/or discussing treatment plans in reference to her The primary encounter diagnosis was Obesity (BMI 30-39.9). A diagnosis of Gastroesophageal reflux disease without esophagitis was also pertinent to this visit.

## 2018-03-01 NOTE — PATIENT INSTRUCTIONS
Amazon:  Nature's Answer Alcohol-Free Gymnema Leaf, 1-Fluid Ounce. Dilute 4 drops in a water filled at 4 oz spray bottle. 2 - 4 sprays in your mouth. Body Mass Index: Care Instructions  Your Care Instructions    Body mass index (BMI) can help you see if your weight is raising your risk for health problems. It uses a formula to compare how much you weigh with how tall you are. · A BMI lower than 18.5 is considered underweight. · A BMI between 18.5 and 24.9 is considered healthy. · A BMI between 25 and 29.9 is considered overweight. A BMI of 30 or higher is considered obese. If your BMI is in the normal range, it means that you have a lower risk for weight-related health problems. If your BMI is in the overweight or obese range, you may be at increased risk for weight-related health problems, such as high blood pressure, heart disease, stroke, arthritis or joint pain, and diabetes. If your BMI is in the underweight range, you may be at increased risk for health problems such as fatigue, lower protection (immunity) against illness, muscle loss, bone loss, hair loss, and hormone problems. BMI is just one measure of your risk for weight-related health problems. You may be at higher risk for health problems if you are not active, you eat an unhealthy diet, or you drink too much alcohol or use tobacco products. Follow-up care is a key part of your treatment and safety. Be sure to make and go to all appointments, and call your doctor if you are having problems. It's also a good idea to know your test results and keep a list of the medicines you take. How can you care for yourself at home? · Practice healthy eating habits. This includes eating plenty of fruits, vegetables, whole grains, lean protein, and low-fat dairy. · If your doctor recommends it, get more exercise. Walking is a good choice. Bit by bit, increase the amount you walk every day.  Try for at least 30 minutes on most days of the week.  · Do not smoke. Smoking can increase your risk for health problems. If you need help quitting, talk to your doctor about stop-smoking programs and medicines. These can increase your chances of quitting for good. · Limit alcohol to 2 drinks a day for men and 1 drink a day for women. Too much alcohol can cause health problems. If you have a BMI higher than 25  · Your doctor may do other tests to check your risk for weight-related health problems. This may include measuring the distance around your waist. A waist measurement of more than 40 inches in men or 35 inches in women can increase the risk of weight-related health problems. · Talk with your doctor about steps you can take to stay healthy or improve your health. You may need to make lifestyle changes to lose weight and stay healthy, such as changing your diet and getting regular exercise. If you have a BMI lower than 18.5  · Your doctor may do other tests to check your risk for health problems. · Talk with your doctor about steps you can take to stay healthy or improve your health. You may need to make lifestyle changes to gain or maintain weight and stay healthy, such as getting more healthy foods in your diet and doing exercises to build muscle. Where can you learn more? Go to http://matthew-ashley.info/. Enter S176 in the search box to learn more about \"Body Mass Index: Care Instructions. \"  Current as of: October 13, 2016  Content Version: 11.4  © 9933-7679 Healthwise, Incorporated. Care instructions adapted under license by [employer] (which disclaims liability or warranty for this information). If you have questions about a medical condition or this instruction, always ask your healthcare professional. [place] [address] any warranty or liability for your use of this information.

## 2018-03-01 NOTE — MR AVS SNAPSHOT
[facility] [address] [facility] [place]. [address] [facility] [address] [address] 
[phone] Patient: Jose Woodard MRN: [id number] :1975 Visit Information Date & Time Provider Department Dept. Phone Encounter #  
 3/1/2018  3:45 PM Ciara Goldman, [address] [place] [address],[address] [phone] [id number] Follow-up Instructions Return in about 6 weeks (around 2018) for [facility] & Lab Draw. Your Appointments 3/9/2018 12:86 AM  
METABOLIC PROGRAM 5 with HRMP WEEKLY CLASS AMA  
HR Metabolic Program ([facility]) Appt Note: WEIGH IN  
 HR Metabolic Program ([facility] SCHEDULING) [phone] Upcoming Health Maintenance Date Due DTaP/Tdap/Td series (1 - Tdap) 1998 PAP AKA CERVICAL CYTOLOGY 3/12/2015 Influenza Age 5 to Adult 2017 Allergies as of 3/1/2018  Review Complete On: 3/1/2018 By: Ciara Goldman DO No Known Allergies Current Immunizations  Reviewed on 2018 No immunizations on file. Not reviewed this visit You Were Diagnosed With   
  
 Codes Comments Obesity (BMI 30-39.9)    -  Primary ICD-10-CM: H24.8 ICD-9-CM: 278.00 Gastroesophageal reflux disease without esophagitis     ICD-10-CM: K21.9 ICD-9-CM: 530.81 Vitals BP Pulse Temp Resp Height(growth percentile) Weight(growth percentile) 110/74 77 98.1 °F (36.7 °C) (Oral) 16 5' 9\" (1.753 m) 251 lb 14.4 oz (114.3 kg) BMI OB Status Smoking Status 37.2 kg/m2 IUD Never Smoker Vitals History BMI and BSA Data Body Mass Index Body Surface Area  
 37.2 kg/m 2 2.36 m 2 Your Updated Medication List  
  
   
This list is accurate as of 3/1/18  4:19 PM.  Always use your most recent med list.  
  
  
  
  
 MULTI-VITAMIN PO Take  by mouth. Follow-up Instructions Return in about 6 weeks (around 2018) for [facility] & Lab Draw. Patient Instructions Amazon: [employer] Answer Alcohol-Free Gymnema Leaf, 1-Fluid Ounce. Dilute 4 drops in a water filled at 4 oz spray bottle. 2 - 4 sprays in your mouth. Body Mass Index: Care Instructions Your Care Instructions Body mass index (BMI) can help you see if your weight is raising your risk for health problems. It uses a formula to compare how much you weigh with how tall you are. · A BMI lower than 18.5 is considered underweight. · A BMI between 18.5 and 24.9 is considered healthy. · A BMI between 25 and 29.9 is considered overweight. A BMI of 30 or higher is considered obese. If your BMI is in the normal range, it means that you have a lower risk for weight-related health problems. If your BMI is in the overweight or obese range, you may be at increased risk for weight-related health problems, such as high blood pressure, heart disease, stroke, arthritis or joint pain, and diabetes. If your BMI is in the underweight range, you may be at increased risk for health problems such as fatigue, lower protection (immunity) against illness, muscle loss, bone loss, hair loss, and hormone problems. BMI is just one measure of your risk for weight-related health problems. You may be at higher risk for health problems if you are not active, you eat an unhealthy diet, or you drink too much alcohol or use tobacco products. Follow-up care is a key part of your treatment and safety. Be sure to make and go to all appointments, and call your doctor if you are having problems. It's also a good idea to know your test results and keep a list of the medicines you take. How can you care for yourself at home? · Practice healthy eating habits. This includes eating plenty of fruits, vegetables, whole grains, lean protein, and low-fat dairy. · If your doctor recommends it, get more exercise. Walking is a good choice. Bit by bit, increase the amount you walk every day. Try for at least 30 minutes on most days of the week. · Do not smoke. Smoking can increase your risk for health problems. If you need help quitting, talk to your doctor about stop-smoking programs and medicines. These can increase your chances of quitting for good. · Limit alcohol to 2 drinks a day for men and 1 drink a day for women. Too much alcohol can cause health problems. If you have a BMI higher than 25 · Your doctor may do other tests to check your risk for weight-related health problems. This may include measuring the distance around your waist. A waist measurement of more than 40 inches in men or 35 inches in women can increase the risk of weight-related health problems. · Talk with your doctor about steps you can take to stay healthy or improve your health. You may need to make lifestyle changes to lose weight and stay healthy, such as changing your diet and getting regular exercise. If you have a BMI lower than 18.5 · Your doctor may do other tests to check your risk for health problems. · Talk with your doctor about steps you can take to stay healthy or improve your health. You may need to make lifestyle changes to gain or maintain weight and stay healthy, such as getting more healthy foods in your diet and doing exercises to build muscle. Where can you learn more? Go to http://matthew-ashley.info/. Enter S176 in the search box to learn more about \"Body Mass Index: Care Instructions. \" Current as of: October 13, 2016 Content Version: 11.4 © 5516-6317 Healthwise, Incorporated. Care instructions adapted under license by [employer] (which disclaims liability or warranty for this information). If you have questions about a medical condition or this instruction, always ask your healthcare professional. [place] [address] any warranty or liability for your use of this information. Introducing hospitals & HEALTH SERVICES! Dear Thuy Payne: Thank you for requesting a [employer] account.   Our records indicate that you already have an active eEvent account. You can access your account anytime at [email or web address] Did you know that you can access your hospital and ER discharge instructions at any time in eEvent? You can also review all of your test results from your hospital stay or ER visit. Additional Information If you have questions, please visit the Frequently Asked Questions section of the eEvent website at [email or web address] Remember, eEvent is NOT to be used for urgent needs. For medical emergencies, dial 911. Now available from your iPhone and Android! Please provide this summary of care documentation to your next provider. If you have any questions after today's visit, please call [phone].

## 2018-03-09 ENCOUNTER — CLINICAL SUPPORT (OUTPATIENT)
Dept: FAMILY MEDICINE CLINIC | Age: 43
End: 2018-03-09

## 2018-03-09 VITALS
HEIGHT: 69 IN | RESPIRATION RATE: 16 BRPM | HEART RATE: 85 BPM | BODY MASS INDEX: 36.97 KG/M2 | TEMPERATURE: 96.2 F | DIASTOLIC BLOOD PRESSURE: 68 MMHG | WEIGHT: 249.6 LBS | SYSTOLIC BLOOD PRESSURE: 121 MMHG

## 2018-03-09 DIAGNOSIS — E66.9 OBESITY (BMI 35.0-39.9 WITHOUT COMORBIDITY): Primary | ICD-10-CM

## 2018-03-09 NOTE — PROGRESS NOTES
Progress Note: Weekly Education Class in the [facility]   Is there anything that you or the patient needs to let Dr Page Jalloh know about? no  Over the past week, have you experienced any side-effects? no    Dorina Guillaume is a 38 y.o. female who is enrolled in [facility]    There were no vitals taken for this visit. Weight Metrics 3/1/2018 3/1/2018 2/22/2018 2/16/2018 2/8/2018 2/1/2018 1/25/2018   Weight - 251 lb 14.4 oz 248 lb 12.8 oz 248 lb 12.8 oz 251 lb 9.6 oz 255 lb 9.6 oz 259 lb 9.6 oz   Waist Measure Inches - - 39 38 40 42 39.5   Body Fat % 42 - 36 36 36.9 37 38   BMI - 37.2 kg/m2 36.74 kg/m2 36.74 kg/m2 37.15 kg/m2 37.75 kg/m2 38.34 kg/m2         Have you received any other medical care this week? no  If yes, where and for what? Have you had any change in your medications since your last visit? no  If yes what? Did you have any problems adhering to the program last week? no  If yes, please explain:       Eating Habits Over Last Week:  Did you take in 64 oz of non-caloric fluids? no ; most days    Did you consume your 4 meal replacements each day?  yes       Physical Activity Over the Past Week:    Aerobic exercise: 150 min  Resistance exercise: 3 workouts / week

## 2018-03-15 ENCOUNTER — CLINICAL SUPPORT (OUTPATIENT)
Dept: FAMILY MEDICINE CLINIC | Age: 43
End: 2018-03-15

## 2018-03-15 VITALS
BODY MASS INDEX: 37.01 KG/M2 | HEIGHT: 69 IN | DIASTOLIC BLOOD PRESSURE: 77 MMHG | WEIGHT: 249.9 LBS | HEART RATE: 67 BPM | SYSTOLIC BLOOD PRESSURE: 120 MMHG

## 2018-03-15 DIAGNOSIS — E66.9 OBESITY (BMI 35.0-39.9 WITHOUT COMORBIDITY): Primary | ICD-10-CM

## 2018-03-15 NOTE — PROGRESS NOTES
Progress Note: Weekly Education Class in the [facility]   Is there anything that you or the patient needs to let Dr Dos Santos Kim know about? no  Over the past week, have you experienced any side-effects? no    Karlos Prasad is a 38 y.o. female who is enrolled in [facility]    Visit Vitals    /77    Pulse 67    Ht 5' 9\" (1.753 m)    Wt 249 lb 14.4 oz (113.4 kg)    BMI 36.9 kg/m2     Weight Metrics 3/15/2018 3/15/2018 3/9/2018 3/1/2018 3/1/2018 2/22/2018 2/16/2018   Weight - 249 lb 14.4 oz 249 lb 9.6 oz - 251 lb 14.4 oz 248 lb 12.8 oz 248 lb 12.8 oz   Waist Measure Inches - - - - - 39 38   Body Fat % 36 - 41.7 42 - 36 36   BMI - 36.9 kg/m2 36.86 kg/m2 - 37.2 kg/m2 36.74 kg/m2 36.74 kg/m2         Have you received any other medical care this week? no  If yes, where and for what? Have you had any change in your medications since your last visit? no  If yes what? Did you have any problems adhering to the program last week? no  If yes, please explain:       Eating Habits Over Last Week:  Did you take in 64 oz of non-caloric fluids? yes     Did you consume your 4 meal replacements each day?  yes       Physical Activity Over the Past Week:    Aerobic exercise: 0 min  Resistance exercise: 0 workouts / week

## 2018-03-22 ENCOUNTER — CLINICAL SUPPORT (OUTPATIENT)
Dept: FAMILY MEDICINE CLINIC | Age: 43
End: 2018-03-22

## 2018-03-22 VITALS
DIASTOLIC BLOOD PRESSURE: 77 MMHG | BODY MASS INDEX: 36.73 KG/M2 | HEART RATE: 79 BPM | HEIGHT: 69 IN | WEIGHT: 248 LBS | SYSTOLIC BLOOD PRESSURE: 103 MMHG

## 2018-03-22 DIAGNOSIS — E66.9 OBESITY (BMI 30-39.9): Primary | ICD-10-CM

## 2018-03-22 NOTE — PROGRESS NOTES
Progress Note: Weekly Education Class in the [facility]   Is there anything that you or the patient needs to let Dr Fernando Rojas know about? no  Over the past week, have you experienced any side-effects? no    Hernandez Gaspar is a 38 y.o. female who is enrolled in [facility]    Visit Vitals    /77    Pulse 79    Ht 5' 9\" (1.753 m)    Wt 248 lb (112.5 kg)    BMI 36.62 kg/m2     Weight Metrics 3/22/2018 3/22/2018 3/15/2018 3/15/2018 3/9/2018 3/1/2018 3/1/2018   Weight - 248 lb - 249 lb 14.4 oz 249 lb 9.6 oz - 251 lb 14.4 oz   Waist Measure Inches - - - - - - -   Body Fat % 36.4 - 36 - 41.7 42 -   BMI - 36.62 kg/m2 - 36.9 kg/m2 36.86 kg/m2 - 37.2 kg/m2         Have you received any other medical care this week? no  If yes, where and for what? Have you had any change in your medications since your last visit? no  If yes what? Did you have any problems adhering to the program last week? no  If yes, please explain:       Eating Habits Over Last Week:  Did you take in 64 oz of non-caloric fluids? yes     Did you consume your 4 meal replacements each day?  yes       Physical Activity Over the Past Week:    Aerobic exercise: 0 min  Resistance exercise: 0 workouts / week

## 2018-03-23 LAB
A-G RATIO,AGRAT: 1.3 RATIO (ref 1.1–2.6)
ALBUMIN SERPL-MCNC: 4.1 G/DL (ref 3.5–5)
ALP SERPL-CCNC: 65 U/L (ref 25–115)
ALT SERPL-CCNC: 13 U/L (ref 5–40)
ANION GAP SERPL CALC-SCNC: 15 MMOL/L
AST SERPL W P-5'-P-CCNC: 17 U/L (ref 10–37)
BILIRUB SERPL-MCNC: 0.4 MG/DL (ref 0.2–1.2)
BUN SERPL-MCNC: 16 MG/DL (ref 6–22)
CALCIUM SERPL-MCNC: 9 MG/DL (ref 8.4–10.5)
CHLORIDE SERPL-SCNC: 103 MMOL/L (ref 98–110)
CO2 SERPL-SCNC: 21 MMOL/L (ref 20–32)
CREAT SERPL-MCNC: 0.7 MG/DL (ref 0.5–1.2)
GFRAA, 66117: >60
GFRNA, 66118: >60
GLOBULIN,GLOB: 3.2 G/DL (ref 2–4)
GLUCOSE SERPL-MCNC: 81 MG/DL (ref 70–99)
POTASSIUM SERPL-SCNC: 4.7 MMOL/L (ref 3.5–5.5)
PROT SERPL-MCNC: 7.3 G/DL (ref 6.4–8.3)
SODIUM SERPL-SCNC: 139 MMOL/L (ref 133–145)
URATE SERPL-MCNC: 3.4 MG/DL (ref 2.2–7.7)

## 2018-04-05 ENCOUNTER — OFFICE VISIT (OUTPATIENT)
Dept: FAMILY MEDICINE CLINIC | Age: 43
End: 2018-04-05

## 2018-04-05 VITALS
HEIGHT: 69 IN | HEART RATE: 87 BPM | SYSTOLIC BLOOD PRESSURE: 112 MMHG | RESPIRATION RATE: 18 BRPM | WEIGHT: 250.3 LBS | DIASTOLIC BLOOD PRESSURE: 70 MMHG | BODY MASS INDEX: 37.07 KG/M2

## 2018-04-05 DIAGNOSIS — K21.9 GASTROESOPHAGEAL REFLUX DISEASE WITHOUT ESOPHAGITIS: ICD-10-CM

## 2018-04-05 DIAGNOSIS — E66.9 OBESITY (BMI 30-39.9): Primary | ICD-10-CM

## 2018-04-05 NOTE — MR AVS SNAPSHOT
Hair Moraes [place] [address] [facility] [place]. [address] [facility] [address] [id number] 
[phone] Patient: Vanessa Terry MRN: [id number] :[id number] Visit Information Date & Time Provider Department Dept. Phone Encounter #  
 2018 10:15 [id number] Follow-up Instructions Return in about 4 weeks (around 5/3/2018) for MS[facility] & Lab Draw. Your Appointments 2018 51:91 AM  
METABOLIC PROGRAM 5 with HRMP WEEKLY CLASS AMA  
HR Metabolic Program ([facility]) Appt Note: weigh in  
 HR Metabolic Program ([facility]) [phone] 2018 82:59 AM  
METABOLIC PROGRAM 5 with HRMP WEEKLY CLASS AMA  
HR Metabolic Program ([facility]) Appt Note: weigh in  
 HR Metabolic Program ([facility]) [phone]  
  
    
 2018 45:90 AM  
METABOLIC PROGRAM 5 with HRMP WEEKLY CLASS AMA  
HR Metabolic Program ([facility]) Appt Note: weigh in appt HR Metabolic Program ([facility]) [phone] Upcoming Health Maintenance Date Due DTaP/Tdap/Td series (1 - Tdap) 1998 PAP AKA CERVICAL CYTOLOGY 3/12/2015 Influenza Age 5 to Adult 2017 Allergies as of 2018  Review Complete On: 2018 By: Ledy Mares DO No Known Allergies Current Immunizations  Reviewed on 2018 No immunizations on file. Not reviewed this visit You Were Diagnosed With   
  
 Codes Comments Obesity (BMI 30-39.9)    -  Primary ICD-10-CM: D92.0 ICD-9-CM: 278.00 Gastroesophageal reflux disease without esophagitis     ICD-10-CM: K21.9 ICD-9-CM: 530.81 Vitals BP Pulse Resp Height(growth percentile) Weight(growth percentile) BMI  
 112/70 87 18 5' 9\" (1.753 m) 250 lb 4.8 oz (113.5 kg) 36.96 kg/m2 OB Status Smoking Status IUD Never Smoker Vitals History BMI and BSA Data Body Mass Index Body Surface Area  
 36.96 kg/m 2 2.35 m 2 Your Updated Medication List  
  
   
This list is accurate as of 4/5/18 10:50 AM.  Always use your most recent med list.  
  
  
  
  
 MULTI-VITAMIN PO Take  by mouth. Follow-up Instructions Return in about 4 weeks (around 5/3/2018) for MSWL & Lab Draw. Patient Instructions Body Mass Index: Care Instructions Your Care Instructions Body mass index (BMI) can help you see if your weight is raising your risk for health problems. It uses a formula to compare how much you weigh with how tall you are. · A BMI lower than 18.5 is considered underweight. · A BMI between 18.5 and 24.9 is considered healthy. · A BMI between 25 and 29.9 is considered overweight. A BMI of 30 or higher is considered obese. If your BMI is in the normal range, it means that you have a lower risk for weight-related health problems. If your BMI is in the overweight or obese range, you may be at increased risk for weight-related health problems, such as high blood pressure, heart disease, stroke, arthritis or joint pain, and diabetes. If your BMI is in the underweight range, you may be at increased risk for health problems such as fatigue, lower protection (immunity) against illness, muscle loss, bone loss, hair loss, and hormone problems. BMI is just one measure of your risk for weight-related health problems. You may be at higher risk for health problems if you are not active, you eat an unhealthy diet, or you drink too much alcohol or use tobacco products. Follow-up care is a key part of your treatment and safety. Be sure to make and go to all appointments, and call your doctor if you are having problems. It's also a good idea to know your test results and keep a list of the medicines you take. How can you care for yourself at home? · Practice healthy eating habits.  This includes eating plenty of fruits, vegetables, whole grains, lean protein, and low-fat dairy. · If your doctor recommends it, get more exercise. Walking is a good choice. Bit by bit, increase the amount you walk every day. Try for at least 30 minutes on most days of the week. · Do not smoke. Smoking can increase your risk for health problems. If you need help quitting, talk to your doctor about stop-smoking programs and medicines. These can increase your chances of quitting for good. · Limit alcohol to 2 drinks a day for men and 1 drink a day for women. Too much alcohol can cause health problems. If you have a BMI higher than 25 · Your doctor may do other tests to check your risk for weight-related health problems. This may include measuring the distance around your waist. A waist measurement of more than 40 inches in men or 35 inches in women can increase the risk of weight-related health problems. · Talk with your doctor about steps you can take to stay healthy or improve your health. You may need to make lifestyle changes to lose weight and stay healthy, such as changing your diet and getting regular exercise. If you have a BMI lower than 18.5 · Your doctor may do other tests to check your risk for health problems. · Talk with your doctor about steps you can take to stay healthy or improve your health. You may need to make lifestyle changes to gain or maintain weight and stay healthy, such as getting more healthy foods in your diet and doing exercises to build muscle. Where can you learn more? Go to http://matthew-ashley.info/. Enter S176 in the search box to learn more about \"Body Mass Index: Care Instructions. \" Current as of: October 13, 2016 Content Version: 11.4 © 5083-3418 Healthwise, Incorporated. Care instructions adapted under license by [employer] (which disclaims liability or warranty for this information).  If you have questions about a medical condition or this instruction, always ask your healthcare professional. [address] any warranty or liability for your use of this information. Introducing [facility]! Dear Valentin Johnson: Thank you for requesting a [facility] account. Our records indicate that you already have an active [facility] account. You can access your account anytime at [email or web address] Did you know that you can access your hospital and ER discharge instructions at any time in [facility]? You can also review all of your test results from your hospital stay or ER visit. Additional Information If you have questions, please visit the Frequently Asked Questions section of the [facility] website at [email or web address] Remember, [facility] is NOT to be used for urgent needs. For medical emergencies, dial 911. Now available from your iPhone and Android! Please provide this summary of care documentation to your next provider. If you have any questions after today's visit, please call [phone].

## 2018-04-05 NOTE — PATIENT INSTRUCTIONS
Body Mass Index: Care Instructions  Your Care Instructions    Body mass index (BMI) can help you see if your weight is raising your risk for health problems. It uses a formula to compare how much you weigh with how tall you are. · A BMI lower than 18.5 is considered underweight. · A BMI between 18.5 and 24.9 is considered healthy. · A BMI between 25 and 29.9 is considered overweight. A BMI of 30 or higher is considered obese. If your BMI is in the normal range, it means that you have a lower risk for weight-related health problems. If your BMI is in the overweight or obese range, you may be at increased risk for weight-related health problems, such as high blood pressure, heart disease, stroke, arthritis or joint pain, and diabetes. If your BMI is in the underweight range, you may be at increased risk for health problems such as fatigue, lower protection (immunity) against illness, muscle loss, bone loss, hair loss, and hormone problems. BMI is just one measure of your risk for weight-related health problems. You may be at higher risk for health problems if you are not active, you eat an unhealthy diet, or you drink too much alcohol or use tobacco products. Follow-up care is a key part of your treatment and safety. Be sure to make and go to all appointments, and call your doctor if you are having problems. It's also a good idea to know your test results and keep a list of the medicines you take. How can you care for yourself at home? · Practice healthy eating habits. This includes eating plenty of fruits, vegetables, whole grains, lean protein, and low-fat dairy. · If your doctor recommends it, get more exercise. Walking is a good choice. Bit by bit, increase the amount you walk every day. Try for at least 30 minutes on most days of the week. · Do not smoke. Smoking can increase your risk for health problems. If you need help quitting, talk to your doctor about stop-smoking programs and medicines. These can increase your chances of quitting for good. · Limit alcohol to 2 drinks a day for men and 1 drink a day for women. Too much alcohol can cause health problems. If you have a BMI higher than 25  · Your doctor may do other tests to check your risk for weight-related health problems. This may include measuring the distance around your waist. A waist measurement of more than 40 inches in men or 35 inches in women can increase the risk of weight-related health problems. · Talk with your doctor about steps you can take to stay healthy or improve your health. You may need to make lifestyle changes to lose weight and stay healthy, such as changing your diet and getting regular exercise. If you have a BMI lower than 18.5  · Your doctor may do other tests to check your risk for health problems. · Talk with your doctor about steps you can take to stay healthy or improve your health. You may need to make lifestyle changes to gain or maintain weight and stay healthy, such as getting more healthy foods in your diet and doing exercises to build muscle. Where can you learn more? Go to http://matthew-ashley.info/. Enter S176 in the search box to learn more about \"Body Mass Index: Care Instructions. \"  Current as of: October 13, 2016  Content Version: 11.4  © 7669-4229 Healthwise, Incorporated. Care instructions adapted under license by [employer] (which disclaims liability or warranty for this information). If you have questions about a medical condition or this instruction, always ask your healthcare professional. [address] any warranty or liability for your use of this information.

## 2018-04-05 NOTE — PROGRESS NOTES
[facility] Weight Loss Program Progress Note:   F/up Physician Visit for the VLCD / LCD Program    CC: Weight Management    Vanessa Terry is a 38 y.o. female who is here for her f/up physician visit for the [facility] Program.    Weight History  Ideal body weight: 145 lb 15.1 oz (66.2 kg)  Adjusted ideal body weight: 187 lb 11 oz (85.1 kg) (Based on [employer] Tables)    Wt Readings from Last 10 Encounters:   04/05/18 250 lb 4.8 oz (113.5 kg)   03/22/18 248 lb (112.5 kg)   03/15/18 249 lb 14.4 oz (113.4 kg)   03/09/18 249 lb 9.6 oz (113.2 kg)   03/01/18 251 lb 14.4 oz (114.3 kg)   02/22/18 248 lb 12.8 oz (112.9 kg)   02/16/18 248 lb 12.8 oz (112.9 kg)   02/08/18 251 lb 9.6 oz (114.1 kg)   02/01/18 255 lb 9.6 oz (115.9 kg)   01/25/18 259 lb 9.6 oz (117.8 kg)       Weight Metrics 4/5/2018 4/5/2018 3/22/2018 3/22/2018 3/15/2018 3/15/2018 3/9/2018   Weight - 250 lb 4.8 oz - 248 lb - 249 lb 14.4 oz 249 lb 9.6 oz   Waist Measure Inches 39.75 - - - - - -   Body Fat % 41.8 - 36.4 - 36 - 41.7   BMI - 36.96 kg/m2 - 36.62 kg/m2 - 36.9 kg/m2 36.86 kg/m2         Medications Currently Taking    Current Outpatient Prescriptions   Medication Sig Dispense Refill    MULTIVITAMINS WITH FLUORIDE (MULTI-VITAMIN PO) Take  by mouth. Participation   Have you been attending class on a regular basis? yes    Review of Systems  Since your last visit, have you experienced any complications? no    Are you taking an appetite suppressant? no    BP Readings from Last 3 Encounters:   04/05/18 112/70   03/22/18 103/77   03/15/18 120/77     Have you received any other medical care this week? no  If yes, where and for what? Have you discontinued or changed any medicine or dose of your medicine(s) since your last visit with Dr Melissa Christianson? no        Diet  How many ounces of calorie-free liquids did you consume each day?  96 oz  How many meal replacements did you take each day?  2-3  Did you have any problems adhering to the program?  yes   If yes, please explain:  Travelled last week w/ kids for spring break      Exercise  Aerobic exercise: 90 min  Resistance exercise: 0 workouts / week  Any discomfort?  no        Objective  Visit Vitals    /70    Pulse 87    Resp 18    Ht 5' 9\" (1.753 m)    Wt 250 lb 4.8 oz (113.5 kg)    BMI 36.96 kg/m2     No LMP recorded. Patient is not currently having periods (Reason: IUD). Physical Exam  Appearance: Obese, A&O x 3, NAD  HEENT:  NC/AT, EOMI, PERRL, No scleral icterus    Assessment / Plan    Encounter Diagnoses   Name Primary?  Obesity (BMI 30-39. 9) Yes    Gastroesophageal reflux disease without esophagitis        1. Weight management    [place]   Progress was reviewed with patient   Goal(s) for next appointment:   Keep up the good work    2. GERD   Improved  2. Labs    Latest results reviewed with patient   Routine monitoring labs ordered no    -Lab slip given to pt for off-site [facility] labs no    10 minutes of the 15 minutes face to face time with Karla Estrada consisted of counseling & coordinating and/or discussing treatment plans in reference to her The primary encounter diagnosis was Obesity (BMI 30-39.9). A diagnosis of Gastroesophageal reflux disease without esophagitis was also pertinent to this visit.

## 2018-04-12 ENCOUNTER — CLINICAL SUPPORT (OUTPATIENT)
Dept: FAMILY MEDICINE CLINIC | Age: 43
End: 2018-04-12

## 2018-04-12 VITALS
HEIGHT: 69 IN | DIASTOLIC BLOOD PRESSURE: 68 MMHG | RESPIRATION RATE: 16 BRPM | HEART RATE: 88 BPM | SYSTOLIC BLOOD PRESSURE: 112 MMHG | BODY MASS INDEX: 36.63 KG/M2 | WEIGHT: 247.3 LBS

## 2018-04-12 DIAGNOSIS — E66.9 OBESITY (BMI 35.0-39.9 WITHOUT COMORBIDITY): Primary | ICD-10-CM

## 2018-04-12 NOTE — PROGRESS NOTES
Progress Note: Weekly Education Class in the [facility] Weight Loss Program   Is there anything that you or the patient needs to let Dr Sherryll Boast know about? no  Over the past week, have you experienced any side-effects? no    Brittni Turcios is a 38 y.o. female who is enrolled in [facility] Weight Loss Program    Visit Vitals    Ht 5' 9\" (1.753 m)    Wt 247 lb 4.8 oz (112.2 kg)    BMI 36.52 kg/m2     Weight Metrics 4/12/2018 4/5/2018 4/5/2018 3/22/2018 3/22/2018 3/15/2018 3/15/2018   Weight 247 lb 4.8 oz - 250 lb 4.8 oz - 248 lb - 249 lb 14.4 oz   Waist Measure Inches - 39.75 - - - - -   Body Fat % - 41.8 - 36.4 - 36 -   BMI 36.52 kg/m2 - 36.96 kg/m2 - 36.62 kg/m2 - 36.9 kg/m2         Have you received any other medical care this week? no  If yes, where and for what? Have you had any change in your medications since your last visit? no  If yes what? Did you have any problems adhering to the program last week? no  If yes, please explain:       Eating Habits Over Last Week:  Did you take in 64 oz of non-caloric fluids? yes     Did you consume your 4 meal replacements each day?  yes       Physical Activity Over the Past Week:    Aerobic exercise: 120 min  Resistance exercise: 0 workouts / week

## 2018-04-19 ENCOUNTER — CLINICAL SUPPORT (OUTPATIENT)
Dept: FAMILY MEDICINE CLINIC | Age: 43
End: 2018-04-19

## 2018-04-19 VITALS
SYSTOLIC BLOOD PRESSURE: 109 MMHG | WEIGHT: 248.1 LBS | HEART RATE: 89 BPM | DIASTOLIC BLOOD PRESSURE: 76 MMHG | RESPIRATION RATE: 16 BRPM | HEIGHT: 69 IN | BODY MASS INDEX: 36.75 KG/M2

## 2018-04-19 DIAGNOSIS — E66.01 MORBID OBESITY WITH BMI OF 40.0-44.9, ADULT (HCC): Primary | ICD-10-CM

## 2018-04-19 NOTE — PROGRESS NOTES
Progress Note: Weekly Education Class in the [facility] Weight Loss Program   Is there anything that you or the patient needs to let Dr Aurea Wilburn know about? no  Over the past week, have you experienced any side-effects? no    Yan Wallace is a 38 y.o. female who is enrolled in [facility] Loss Program    [facility]\" (1.753 [facility])    Wt 248 lb 1.6 oz (112.5 kg)    BMI 36.64 kg/m2     Weight Metrics 4/19/2018 4/12/2018 4/12/2018 4/5/2018 4/5/2018 3/22/2018 3/22/2018   Weight 248 lb 1.6 oz - 247 lb 4.8 oz - 250 lb 4.8 oz - 248 lb   Waist Measure Inches - 40 - 39.75 - - -   Body Fat % - 41.5 - 41.8 - 36.4 -   BMI 36.64 kg/m2 - 36.52 kg/m2 - 36.96 kg/m2 - 36.62 kg/m2         Have you received any other medical care this week? no  If yes, where and for what? Have you had any change in your medications since your last visit? no  If yes what? Did you have any problems adhering to the program last week? no  If yes, please explain:       Eating Habits Over Last Week:  Did you take in 64 oz of non-caloric fluids? yes     Did you consume your 4 meal replacements each day?  yes       Physical Activity Over the Past Week:    Aerobic exercise: 120 min  Resistance exercise: 2 workouts / week

## 2018-04-26 ENCOUNTER — CLINICAL SUPPORT (OUTPATIENT)
Dept: FAMILY MEDICINE CLINIC | Age: 43
End: 2018-04-26

## 2018-04-26 VITALS
HEART RATE: 80 BPM | DIASTOLIC BLOOD PRESSURE: 71 MMHG | SYSTOLIC BLOOD PRESSURE: 108 MMHG | RESPIRATION RATE: 16 BRPM | WEIGHT: 246.2 LBS | BODY MASS INDEX: 36.36 KG/M2

## 2018-04-26 DIAGNOSIS — E66.01 MORBID OBESITY WITH BMI OF 40.0-44.9, ADULT (HCC): Primary | ICD-10-CM

## 2018-04-26 NOTE — PROGRESS NOTES
Progress Note: Weekly Education Class in the [facility] Weight Loss Program   Is there anything that you or the patient needs to let Dr Fernie Shah know about? no  Over the past week, have you experienced any side-effects? no    Luis Manuel Thomas is a 38 y.o. female who is enrolled in [facility] Weight Loss Program    Visit Vitals    Resp 16    Ht (P) 5' 9\" (1.753 m)    Wt 246 lb 3.2 oz (111.7 kg)    BMI (P) 36.36 kg/m2     Weight Metrics 4/26/2018 4/19/2018 4/19/2018 4/12/2018 4/12/2018 4/5/2018 4/5/2018   Weight 246 lb 3.2 oz - 248 lb 1.6 oz - 247 lb 4.8 oz - 250 lb 4.8 oz   Waist Measure Inches - 39.75 - 40 - 39.75 -   Body Fat % - 41.6 - 41.5 - 41.8 -   BMI 36.36 kg/m2 - 36.64 kg/m2 - 36.52 kg/m2 - 36.96 kg/m2         Have you received any other medical care this week? no  If yes, where and for what? Have you had any change in your medications since your last visit? no  If yes what? Did you have any problems adhering to the program last week? no  If yes, please explain:       Eating Habits Over Last Week:  Did you take in 64 oz of non-caloric fluids? yes     Did you consume your 4 meal replacements each day?  yes       Physical Activity Over the Past Week:    Aerobic exercise: 135 min  Resistance exercise: 1 workouts / week

## 2018-05-11 ENCOUNTER — CLINICAL SUPPORT (OUTPATIENT)
Dept: FAMILY MEDICINE CLINIC | Age: 43
End: 2018-05-11

## 2018-05-11 VITALS
HEIGHT: 69 IN | SYSTOLIC BLOOD PRESSURE: 107 MMHG | DIASTOLIC BLOOD PRESSURE: 78 MMHG | RESPIRATION RATE: 18 BRPM | HEART RATE: 79 BPM | BODY MASS INDEX: 36.67 KG/M2 | WEIGHT: 247.6 LBS

## 2018-05-11 DIAGNOSIS — E66.01 MORBID OBESITY WITH BMI OF 40.0-44.9, ADULT (HCC): Primary | ICD-10-CM

## 2018-05-11 NOTE — PROGRESS NOTES
Progress Note: Weekly Education Class in the [facility] Weight Loss Program   Is there anything that you or the patient needs to let Dr Kathryn Holstein know about? no  Over the past week, have you experienced any side-effects? no    Morenita Cheatham is a 38 y.o. female who is enrolled in [facility] Weight Loss Program    Visit Vitals    Ht 5' 9\" (1.753 m)     Weight Metrics 4/26/2018 4/19/2018 4/19/2018 4/12/2018 4/12/2018 4/5/2018 4/5/2018   Weight 246 lb 3.2 oz - 248 lb 1.6 oz - 247 lb 4.8 oz - 250 lb 4.8 oz   Waist Measure Inches 40.5 39.75 - 40 - 39.75 -   Body Fat % 41.3 41.6 - 41.5 - 41.8 -   BMI 36.36 kg/m2 - 36.64 kg/m2 - 36.52 kg/m2 - 36.96 kg/m2         Have you received any other medical care this week? yes  If yes, where and for what? [facility] Left [facility] sprain    Have you had any change in your medications since your last visit? no  If yes what? Did you have any problems adhering to the program last week? yes If yes, please explain: falling asleep before she can get that last shake in. Eating Habits Over Last Week:  Did you take in 64 oz of non-caloric fluids? yes     Did you consume your 4 meal replacements each day?  yes       Physical Activity Over the Past Week:    Aerobic exercise: 0 min  Resistance exercise: 0 workouts / week

## 2018-05-24 ENCOUNTER — OFFICE VISIT (OUTPATIENT)
Dept: FAMILY MEDICINE CLINIC | Age: 43
End: 2018-05-24

## 2018-05-24 VITALS
HEART RATE: 76 BPM | SYSTOLIC BLOOD PRESSURE: 108 MMHG | RESPIRATION RATE: 16 BRPM | BODY MASS INDEX: 36.53 KG/M2 | HEIGHT: 69 IN | DIASTOLIC BLOOD PRESSURE: 78 MMHG | WEIGHT: 246.6 LBS

## 2018-05-24 DIAGNOSIS — K21.9 GASTROESOPHAGEAL REFLUX DISEASE WITHOUT ESOPHAGITIS: ICD-10-CM

## 2018-05-24 DIAGNOSIS — E66.9 OBESITY (BMI 30-39.9): Primary | ICD-10-CM

## 2018-05-24 NOTE — PATIENT INSTRUCTIONS
Keep up the good work! Body Mass Index: Care Instructions  Your Care Instructions    Body mass index (BMI) can help you see if your weight is raising your risk for health problems. It uses a formula to compare how much you weigh with how tall you are. · A BMI lower than 18.5 is considered underweight. · A BMI between 18.5 and 24.9 is considered healthy. · A BMI between 25 and 29.9 is considered overweight. A BMI of 30 or higher is considered obese. If your BMI is in the normal range, it means that you have a lower risk for weight-related health problems. If your BMI is in the overweight or obese range, you may be at increased risk for weight-related health problems, such as high blood pressure, heart disease, stroke, arthritis or joint pain, and diabetes. If your BMI is in the underweight range, you may be at increased risk for health problems such as fatigue, lower protection (immunity) against illness, muscle loss, bone loss, hair loss, and hormone problems. BMI is just one measure of your risk for weight-related health problems. You may be at higher risk for health problems if you are not active, you eat an unhealthy diet, or you drink too much alcohol or use tobacco products. Follow-up care is a key part of your treatment and safety. Be sure to make and go to all appointments, and call your doctor if you are having problems. It's also a good idea to know your test results and keep a list of the medicines you take. How can you care for yourself at home? · Practice healthy eating habits. This includes eating plenty of fruits, vegetables, whole grains, lean protein, and low-fat dairy. · If your doctor recommends it, get more exercise. Walking is a good choice. Bit by bit, increase the amount you walk every day. Try for at least 30 minutes on most days of the week. · Do not smoke. Smoking can increase your risk for health problems.  If you need help quitting, talk to your doctor about stop-smoking programs and medicines. These can increase your chances of quitting for good. · Limit alcohol to 2 drinks a day for men and 1 drink a day for women. Too much alcohol can cause health problems. If you have a BMI higher than 25  · Your doctor may do other tests to check your risk for weight-related health problems. This may include measuring the distance around your waist. A waist measurement of more than 40 inches in men or 35 inches in women can increase the risk of weight-related health problems. · Talk with your doctor about steps you can take to stay healthy or improve your health. You may need to make lifestyle changes to lose weight and stay healthy, such as changing your diet and getting regular exercise. If you have a BMI lower than 18.5  · Your doctor may do other tests to check your risk for health problems. · Talk with your doctor about steps you can take to stay healthy or improve your health. You may need to make lifestyle changes to gain or maintain weight and stay healthy, such as getting more healthy foods in your diet and doing exercises to build muscle. Where can you learn more? Go to http://matthew-ashley.info/. Enter S176 in the search box to learn more about \"Body Mass Index: Care Instructions. \"  Current as of: October 13, 2016  Content Version: 11.4  © 8946-9173 Healthwise, Incorporated. Care instructions adapted under license by [employer] (which disclaims liability or warranty for this information). If you have questions about a medical condition or this instruction, always ask your healthcare professional. [place] [address] any warranty or liability for your use of this information.

## 2018-05-24 NOTE — MR AVS SNAPSHOT
[facility]air [facility] [address] [facility] [place]. [address] [facility] 83 [id number] 
[phone] Patient: Mandeep Cleaning MRN: [id number] :1975 Visit Information Date & Time Provider Department Dept. Phone Encounter #  
 2018 11:45 AM Sergio Helmsarah 13 [id number] Follow-up Instructions Return in about 4 weeks (around 2018) for MS[facility] & Lab Draw. Follow-up and Disposition History Your Appointments 2018 51:61 AM  
METABOLIC PROGRAM 5 with HRMP WEEKLY CLASS AMA  
HR Metabolic Program ([place] [facility]) Appt Note: wt check and bp check HR Metabolic Program ([facility]) [phone] Upcoming Health Maintenance Date Due DTaP/Tdap/Td series (1 - Tdap) 1998 PAP AKA CERVICAL CYTOLOGY 3/12/2015 Influenza Age 5 to Adult 2018 Allergies as of 2018  Review Complete On: 2018 By: Areli Castillo DO No Known Allergies Current Immunizations  Reviewed on 2018 No immunizations on file. Not reviewed this visit You Were Diagnosed With   
  
 Codes Comments Obesity (BMI 30-39.9)    -  Primary ICD-10-CM: X33.0 ICD-9-CM: 278.00 Gastroesophageal reflux disease without esophagitis     ICD-10-CM: K21.9 ICD-9-CM: 530.81 Vitals BP Pulse Resp Height(growth percentile) Weight(growth percentile) BMI  
 108/78 76 16 5' 9\" (1.753 m) 246 lb 9.6 oz (111.9 kg) 36.42 kg/m2 OB Status Smoking Status IUD Never Smoker Vitals History BMI and BSA Data Body Mass Index Body Surface Area  
 36.42 kg/m 2 2.33 m 2 Your Updated Medication List  
  
   
This list is accurate as of 18 12:06 PM.  Always use your most recent med list.  
  
  
  
  
 MULTI-VITAMIN PO Take  by mouth. Follow-up Instructions Return in about 4 weeks (around 2018) for MSWL & Lab Draw. Patient Instructions Keep up the good work! Body Mass Index: Care Instructions Your Care Instructions Body mass index (BMI) can help you see if your weight is raising your risk for health problems. It uses a formula to compare how much you weigh with how tall you are. · A BMI lower than 18.5 is considered underweight. · A BMI between 18.5 and 24.9 is considered healthy. · A BMI between 25 and 29.9 is considered overweight. A BMI of 30 or higher is considered obese. If your BMI is in the normal range, it means that you have a lower risk for weight-related health problems. If your BMI is in the overweight or obese range, you may be at increased risk for weight-related health problems, such as high blood pressure, heart disease, stroke, arthritis or joint pain, and diabetes. If your BMI is in the underweight range, you may be at increased risk for health problems such as fatigue, lower protection (immunity) against illness, muscle loss, bone loss, hair loss, and hormone problems. BMI is just one measure of your risk for weight-related health problems. You may be at higher risk for health problems if you are not active, you eat an unhealthy diet, or you drink too much alcohol or use tobacco products. Follow-up care is a key part of your treatment and safety. Be sure to make and go to all appointments, and call your doctor if you are having problems. It's also a good idea to know your test results and keep a list of the medicines you take. How can you care for yourself at home? · Practice healthy eating habits. This includes eating plenty of fruits, vegetables, whole grains, lean protein, and low-fat dairy. · If your doctor recommends it, get more exercise. Walking is a good choice. Bit by bit, increase the amount you walk every day. Try for at least 30 minutes on most days of the week. · Do not smoke. Smoking can increase your risk for health problems.  If you need help quitting, talk to your doctor about stop-smoking programs and medicines. These can increase your chances of quitting for good. · Limit alcohol to 2 drinks a day for men and 1 drink a day for women. Too much alcohol can cause health problems. If you have a BMI higher than 25 · Your doctor may do other tests to check your risk for weight-related health problems. This may include measuring the distance around your waist. A waist measurement of more than 40 inches in men or 35 inches in women can increase the risk of weight-related health problems. · Talk with your doctor about steps you can take to stay healthy or improve your health. You may need to make lifestyle changes to lose weight and stay healthy, such as changing your diet and getting regular exercise. If you have a BMI lower than 18.5 · Your doctor may do other tests to check your risk for health problems. · Talk with your doctor about steps you can take to stay healthy or improve your health. You may need to make lifestyle changes to gain or maintain weight and stay healthy, such as getting more healthy foods in your diet and doing exercises to build muscle. Where can you learn more? Go to http://matthew-ashley.info/. Enter S176 in the search box to learn more about \"Body Mass Index: Care Instructions. \" Current as of: October 13, 2016 Content Version: 11.4 © 5953-0088 Healthwise, Incorporated. Care instructions adapted under license by [employer] (which disclaims liability or warranty for this information). If you have questions about a medical condition or this instruction, always ask your healthcare professional. [place] [address] any warranty or liability for your use of this information. Patient Instructions History Introducing [facility] & HE[facility] SERVICES! Dear Crispin Fong: Thank you for requesting a [email or web address] account.   Our records indicate that you already have an active [employer] account. You can access your account anytime at [email or web address] Did you know that you can access your hospital and ER discharge instructions at any time in [employer]? You can also review all of your test results from your hospital stay or ER visit. Additional Information If you have questions, please visit the Frequently Asked Questions section of the [employer] website at [email or web address] Remember, [employer] is NOT to be used for urgent needs. For medical emergencies, dial 911. Now available from your iPhone and Android! Please provide this summary of care documentation to your next provider. If you have any questions after today's visit, please call [phone].

## 2018-05-24 NOTE — PROGRESS NOTES
[facility] Weight Loss Program Progress Note:   F/up Physician Visit for the VLCD / LCD Program    CC: Weight Management    Lenora Vicente is a 38 y.o. female who is here for her f/up physician visit for the [facility] Program.    Weight History  Ideal body weight: 145 lb 15.1 oz (66.2 kg)  Adjusted ideal body weight: 186 lb 3.3 oz (84.5 kg) (Based on [employer] Tables)    Wt Readings from Last 10 Encounters:   05/24/18 246 lb 9.6 oz (111.9 kg)   05/11/18 247 lb 9.6 oz (112.3 kg)   04/26/18 246 lb 3.2 oz (111.7 kg)   04/19/18 248 lb 1.6 oz (112.5 kg)   04/12/18 247 lb 4.8 oz (112.2 kg)   04/05/18 250 lb 4.8 oz (113.5 kg)   03/22/18 248 lb (112.5 kg)   03/15/18 249 lb 14.4 oz (113.4 kg)   03/09/18 249 lb 9.6 oz (113.2 kg)   03/01/18 251 lb 14.4 oz (114.3 kg)       Weight Metrics 5/24/2018 5/24/2018 5/11/2018 5/11/2018 4/26/2018 4/19/2018 4/19/2018   Weight - 246 lb 9.6 oz - 247 lb 9.6 oz 246 lb 3.2 oz - 248 lb 1.6 oz   Waist Measure Inches 40 - - - 40.5 39.75 -   Body Fat % 41.4 - 41.5 - 41.3 41.6 -   BMI - 36.42 kg/m2 - 36.56 kg/m2 36.36 kg/m2 - 36.64 kg/m2         Medications Currently Taking  Current Outpatient Prescriptions   Medication Sig Dispense Refill    MULTIVITAMINS WITH FLUORIDE (MULTI-VITAMIN PO) Take  by mouth. Participation   Have you been attending class on a regular basis? yes    Review of Systems  Since your last visit, have you experienced any complications? no  If yes, please list:   Are you taking an appetite suppressant? no      BP Readings from Last 3 Encounters:   05/24/18 108/78   05/11/18 107/78   04/26/18 108/71       Have you received any other medical care this week? no  If yes, where and for what? Have you discontinued or changed any medicine or dose of your medicine(s) since your last visit with Dr Eben Luu?  YES - stopped Prilosec      Diet  How many ounces of calorie-free liquids did you consume each day?  96 oz    How many meal replacements did you take each day? 3-4    Did you have any problems adhering to the program?  no   If yes, please explain:      Exercise  Aerobic exercise: 120 min  Resistance exercise: 0 workouts / week  Any discomfort?  no        Objective  Visit Vitals    /78    Pulse 76    Resp 16    Ht 5' 9\" (1.753 m)    Wt 246 lb 9.6 oz (111.9 kg)    BMI 36.42 kg/m2     No LMP recorded. Patient is not currently having periods (Reason: IUD). Physical Exam  Appearance: Obese, A&O x 3, NAD  HEENT:  NC/AT, EOMI, PERRL, No scleral icterus    Assessment / Plan    Encounter Diagnoses   Name Primary?  Obesity (BMI 30-39. 9) Yes    Gastroesophageal reflux disease without esophagitis      GERD resolved    1. Weight management    improved   Progress was reviewed with patient   Goal(s) for next appointment:   Keep up the good work      2. Labs    Latest results reviewed with patient   Routine monitoring labs ordered yes    -Lab slip given to pt for off-site [facility] labs no    10 minutes of the 15 minutes face to face time with Anjelica Gibson consisted of counseling & coordinating and/or discussing treatment plans in reference to her The primary encounter diagnosis was Obesity (BMI 30-39.9). A diagnosis of Gastroesophageal reflux disease without esophagitis was also pertinent to this visit.

## 2018-06-22 ENCOUNTER — CLINICAL SUPPORT (OUTPATIENT)
Dept: FAMILY MEDICINE CLINIC | Age: 43
End: 2018-06-22

## 2018-06-22 VITALS
HEART RATE: 73 BPM | HEIGHT: 69 IN | BODY MASS INDEX: 36.56 KG/M2 | DIASTOLIC BLOOD PRESSURE: 71 MMHG | RESPIRATION RATE: 16 BRPM | SYSTOLIC BLOOD PRESSURE: 114 MMHG | WEIGHT: 246.8 LBS

## 2018-06-22 DIAGNOSIS — E66.9 OBESITY (BMI 35.0-39.9 WITHOUT COMORBIDITY): Primary | ICD-10-CM

## 2018-06-22 NOTE — PROGRESS NOTES
Progress Note: Weekly Education Class in the [facility] Weight Loss Program   Is there anything that you or the patient needs to let Dr Vaibhav Rich know about? no  Over the past week, have you experienced any side-effects? no    Kelly Temple is a 38 y.o. female who is enrolled in [facility] Weight Loss Program    Visit Vitals    Ht 5' 9\" (1.753 m)     Weight Metrics 5/24/2018 5/24/2018 5/11/2018 5/11/2018 4/26/2018 4/19/2018 4/19/2018   Weight - 246 lb 9.6 oz - 247 lb 9.6 oz 246 lb 3.2 oz - 248 lb 1.6 oz   Waist Measure Inches 40 - - - 40.5 39.75 -   Body Fat % 41.4 - 41.5 - 41.3 41.6 -   BMI - 36.42 kg/m2 - 36.56 kg/m2 36.36 kg/m2 - 36.64 kg/m2         Have you received any other medical care this week? no  If yes, where and for what? Have you had any change in your medications since your last visit? no  If yes what? Did you have any problems adhering to the program last week? yes  If yes, please explain:  While on vacation but back on track now. Eating Habits Over Last Week:  Did you take in 64 oz of non-caloric fluids? yes     Did you consume your 4 meal replacements each day?  yes       Physical Activity Over the Past Week:    Aerobic exercise: 225 min  Resistance exercise: 0 workouts / week

## 2018-07-05 ENCOUNTER — OFFICE VISIT (OUTPATIENT)
Dept: FAMILY MEDICINE CLINIC | Age: 43
End: 2018-07-05

## 2018-07-05 VITALS
SYSTOLIC BLOOD PRESSURE: 109 MMHG | DIASTOLIC BLOOD PRESSURE: 72 MMHG | TEMPERATURE: 97.3 F | OXYGEN SATURATION: 100 % | BODY MASS INDEX: 36.48 KG/M2 | HEART RATE: 85 BPM | HEIGHT: 69 IN | WEIGHT: 246.3 LBS | RESPIRATION RATE: 18 BRPM

## 2018-07-05 DIAGNOSIS — E66.01 SEVERE OBESITY (BMI 35.0-39.9): Primary | ICD-10-CM

## 2018-07-05 NOTE — PROGRESS NOTES
[facility] Weight Loss Program Progress Note:   F/up Physician Visit for the VLCD / LCD Program    CC: Weight Management    Cassy De La Fuente is a 38 y.o. female who is here for her f/up physician visit for the [facility] Program.    Weight History  Ideal body weight: 145 lb 15.1 oz (66.2 kg)  Adjusted ideal body weight: 186 lb 1.4 oz (84.4 kg) (Based on [employer] Tables)    Wt Readings from Last 10 Encounters:   07/05/18 246 lb 4.8 oz (111.7 kg)   06/22/18 246 lb 12.8 oz (111.9 kg)   05/24/18 246 lb 9.6 oz (111.9 kg)   05/11/18 247 lb 9.6 oz (112.3 kg)   04/26/18 246 lb 3.2 oz (111.7 kg)   04/19/18 248 lb 1.6 oz (112.5 kg)   04/12/18 247 lb 4.8 oz (112.2 kg)   04/05/18 250 lb 4.8 oz (113.5 kg)   03/22/18 248 lb (112.5 kg)   03/15/18 249 lb 14.4 oz (113.4 kg)       Weight Metrics 7/5/2018 6/22/2018 5/24/2018 5/24/2018 5/11/2018 5/11/2018 4/26/2018   Weight 246 lb 4.8 oz 246 lb 12.8 oz - 246 lb 9.6 oz - 247 lb 9.6 oz 246 lb 3.2 oz   Waist Measure Inches 39.5 39.5 40 - - - 40.5   Body Fat % 41.4 41.4 41.4 - 41.5 - 41.3   BMI 36.37 kg/m2 36.45 kg/m2 - 36.42 kg/m2 - 36.56 kg/m2 36.36 kg/m2         Medications Currently Taking    Current Outpatient Prescriptions   Medication Sig Dispense Refill    MULTIVITAMINS WITH FLUORIDE (MULTI-VITAMIN PO) Take  by mouth. Participation   Have you been attending class on a regular basis? yes    Review of Systems  Since your last visit, have you experienced any complications? no  If yes, please list:     Are you taking an appetite suppressant? no  If so:  Do you need a refill? no  Are you experiencine any Chest Pain, Palpitations or Dizziness? no    BP Readings from Last 3 Encounters:   07/05/18 109/72   06/22/18 114/71   05/24/18 108/78           Have you received any other medical care this week? no  If yes, where and for what? Have you discontinued or changed any medicine or dose of your medicine(s) since your last visit with Dr Chris Jimenez?  no If yes, where and for what? Diet  How many ounces of calorie-free liquids did you consume each day? 64 oz    How many meal replacements did you take each day? 3-4 meal replacement     Did you have any problems adhering to the program?  yes   A lot of travelling but only gained 0.5 lb      Exercise  Aerobic exercise: 180 min  Resistance exercise: 2 workouts / week      Objective  Visit Vitals    /72 (BP 1 Location: Left arm, BP Patient Position: At rest)    Pulse 85    Temp 97.3 °F (36.3 °C)    Resp 18    Ht 5' 9\" (1.753 m)    Wt 246 lb 4.8 oz (111.7 kg)    SpO2 100%    BMI 36.37 kg/m2     No LMP recorded. Patient is not currently having periods (Reason: IUD). Physical Exam  Appearance: Obese, A&O x 3, NAD  HEENT:  NC/AT, EOMI, PERRL, No scleral icterus    Assessment / Plan    Encounter Diagnosis   Name Primary?  Obesity (BMI 30-39. 9) Yes       1. Weight management    improved   Progress was reviewed with patient   Goal(s) for next appointment:   Stick with the program    2. Labs    Latest results reviewed with patient   Routine monitoring labs ordered no    -Lab slip given to pt for off-site [facility] labs no    10 minutes of the 15 minutes face to face time with Ravindra Silva consisted of counseling & coordinating and/or discussing treatment plans in reference to her The encounter diagnosis was Obesity (BMI 30-39.9).

## 2018-07-05 NOTE — PATIENT INSTRUCTIONS
Get at least 7 hours of sleep    Spread out your meals during the day, plan ahead of when you're going to stay up. Do your stress management    Do this 5 times a day:  Take 5 Breaths  Breathe in for a count of 5  Hold for a count of 5  Breathe out for a count of 5      OTC Remedies:  1. [address] with stress relief   Crab Apple - Helps you with self acceptance   Agrimony - Helps you not be influenced by other people   Gorse - Helps you stay on track when you feel like   giving up     Put 4 drops in 10 oz water or a meal replacement 2 - 4  times a day   Around $15 a vial which lasts ~3 months   www. [employer]  [phone]    2. Nature's Way Calm Aid by Nature[employer] Way - 1 capsule  daily (80 mg food grade lavendar oil)    3.  Theanine Serine by [employer]aturals - 1 pill up to 3 times  a day

## 2018-07-05 NOTE — MR AVS SNAPSHOT
[facility]air [facility] [place] [address] [facility] [address] [facility] 83 [id number] 
[phone] Patient: Perez Donaldson MRN: [id number] :1975 Visit Information Date & Time Provider Department Dept. Phone Encounter #  
 2018  9:30 [id number] Your Appointments 2018 80:40 AM  
METABOLIC PROGRAM 5 with HRMP WEEKLY CLASS AMA  
HR Metabolic Program ([facility] [address]) Appt Note: weigh in  
 HR Metabolic Program ([address] [place], [place] [address]) [phone] 2018 65:09 AM  
METABOLIC PROGRAM 5 with HRMP WEEKLY CLASS AMA  
HR Metabolic Program ([address] [place], [place] [address]) Appt Note: weigh in  
 HR Metabolic Program ([address] [place], [place] [address]) [phone]  
  
    
 2018 62:04 AM  
METABOLIC PROGRAM 5 with HRMP WEEKLY CLASS AMA  
HR Metabolic Program ([address] [place] [address] Road) Appt Note: weigh in  
 HR Metabolic Program ([address] [place] [address] Road) [phone] Upcoming Health Maintenance Date Due DTaP/Tdap/Td series (1 - Tdap) 1998 PAP AKA CERVICAL CYTOLOGY 3/12/2015 Influenza Age 5 to Adult 2018 Allergies as of 2018  Review Complete On: 2018 By: Jessica Rahman DO No Known Allergies Current Immunizations  Reviewed on 2018 No immunizations on file. Not reviewed this visit You Were Diagnosed With   
  
 Codes Comments Severe obesity (BMI 35.0-39.9) ([facility])    -  Primary ICD-10-CM: E66.01 
ICD-9-CM: 278.01 Vitals BP Pulse Temp Resp Height(growth percentile) Weight(growth percentile) 109/72 (BP 1 Location: Left arm, BP Patient Position: At rest) 85 97.3 °F (36.3 °C) 18 5' 9\" (1.753 m) 246 lb 4.8 oz (111.7 kg) SpO2 BMI OB Status Smoking Status 100% 36.37 kg/m2 IUD Never Smoker Vitals History BMI and BSA Data Body Mass Index Body Surface Area  
 36.37 kg/m 2 2.33 m 2 Your Updated Medication List  
  
   
 This list is accurate as of 7/5/18 10:02 AM.  Always use your most recent med list.  
  
  
  
  
 MULTI-VITAMIN PO Take  by mouth. To-Do List   
 07/05/2018 Lab:  METABOLIC PANEL, COMPREHENSIVE Patient Instructions Get at least 7 hours of sleep Spread out your meals during the day, plan ahead of when you're going to stay up. Do your stress management Do this 5 times a day: 
Take 5 Breaths Breathe in for a count of 5 Hold for a count of 5 Breathe out for a count of 5 OTC Remedies: 
1. [address] [facility] with stress relief Crab Apple - Helps you with self acceptance Agrimony - Helps you not be influenced by other people Gorse - Helps you stay on track when you feel like   giving up Put 4 drops in 10 oz water or a meal replacement 2 - 4  times a day Around $15 a vial which lasts ~3 months 
 www. [employer]  [phone] 
 
2. Nature's Way Calm Aid by Nature[employer] Way - 1 capsule  daily (80 mg food grade lavendar oil) 3. Theanine Serine by [employer]aturals - 1 pill up to 3 times  a day Introducing [facility] & HEALTH SERVICES! Dear Ravindra Silva: Thank you for requesting a [employer] account. Our records indicate that you already have an active [employer] account. You can access your account anytime at [email or web address] Did you know that you can access your hospital and ER discharge instructions at any time in [employer]? You can also review all of your test results from your hospital stay or ER visit. Additional Information If you have questions, please visit the Frequently Asked Questions section of the [employer] website at [email or web address] Remember, [employer] is NOT to be used for urgent needs. For medical emergencies, dial 911. Now available from your iPhone and Android! Please provide this summary of care documentation to your next provider. If you have any questions after today's visit, please call [phone].

## 2018-07-26 ENCOUNTER — CLINICAL SUPPORT (OUTPATIENT)
Dept: FAMILY MEDICINE CLINIC | Age: 43
End: 2018-07-26

## 2018-07-26 VITALS — BODY MASS INDEX: 34.57 KG/M2 | WEIGHT: 234.1 LBS

## 2018-07-26 DIAGNOSIS — E66.01 SEVERE OBESITY (BMI 35.0-39.9): Primary | ICD-10-CM

## 2018-07-26 NOTE — PROGRESS NOTES
Progress Note: Weekly Education Class in the [facility] Weight Loss Program   Is there anything that you or the patient needs to let Dr Lorenza Mckenzie know about? no  Over the past week, have you experienced any side-effects? no    Maddison Newberry is a 38 y.o. female who is enrolled in [facility] Weight Loss Program    There were no vitals taken for this visit. Weight Metrics 7/5/2018 7/5/2018 6/22/2018 5/24/2018 5/24/2018 5/11/2018 5/11/2018   Weight - 246 lb 4.8 oz 246 lb 12.8 oz - 246 lb 9.6 oz - 247 lb 9.6 oz   Waist Measure Inches 39.5 - 39.5 40 - - -   Body Fat % 41.4 - 41.4 41.4 - 41.5 -   BMI - 36.37 kg/m2 36.45 kg/m2 - 36.42 kg/m2 - 36.56 kg/m2         Have you received any other medical care this week? no  If yes, where and for what? Have you had any change in your medications since your last visit? no  If yes what? Did you have any problems adhering to the program last week? no  If yes, please explain:       Eating Habits Over Last Week:  Did you take in 64 oz of non-caloric fluids? yes     Did you consume your 4 meal replacements each day?  yes       Physical Activity Over the Past Week:    Aerobic exercise: 420 min  Resistance exercise: 0 workouts / week

## 2018-08-09 ENCOUNTER — CLINICAL SUPPORT (OUTPATIENT)
Dept: FAMILY MEDICINE CLINIC | Age: 43
End: 2018-08-09

## 2018-08-09 VITALS
HEART RATE: 79 BPM | BODY MASS INDEX: 36.02 KG/M2 | WEIGHT: 243.2 LBS | HEIGHT: 69 IN | DIASTOLIC BLOOD PRESSURE: 68 MMHG | SYSTOLIC BLOOD PRESSURE: 103 MMHG

## 2018-08-09 DIAGNOSIS — E66.9 OBESITY (BMI 35.0-39.9 WITHOUT COMORBIDITY): Primary | ICD-10-CM

## 2018-08-09 NOTE — PROGRESS NOTES
Progress Note: Weekly Education Class in the [facility] Weight Loss Program   Is there anything that you or the patient needs to let Dr Marcela Barrow know about? no  Over the past week, have you experienced any side-effects? no    Adán Degroot is a 38 y.o. female who is enrolled in [facility] Weight Loss Program    Visit Vitals    Ht 5' 9\" (1.753 m)    Wt 243 lb 3.2 oz (110.3 kg)    BMI 35.91 kg/m2     Weight Metrics 8/9/2018 7/26/2018 7/5/2018 7/5/2018 6/22/2018 5/24/2018 5/24/2018   Weight 243 lb 3.2 oz 234 lb 1.6 oz - 246 lb 4.8 oz 246 lb 12.8 oz - 246 lb 9.6 oz   Waist Measure Inches - 39 39.5 - 39.5 40 -   Body Fat % - 41 41.4 - 41.4 41.4 -   BMI 35.91 kg/m2 34.57 kg/m2 - 36.37 kg/m2 36.45 kg/m2 - 36.42 kg/m2         Have you received any other medical care this week? no  If yes, where and for what? Have you had any change in your medications since your last visit? no  If yes what? Did you have any problems adhering to the program last week? no  If yes, please explain:       Eating Habits Over Last Week:  Did you take in 64 oz of non-caloric fluids? no     Did you consume your 4 meal replacements each day?  yes       Physical Activity Over the Past Week:    Aerobic exercise: 45 min  Resistance exercise: 0 workouts / week

## 2018-08-30 ENCOUNTER — CLINICAL SUPPORT (OUTPATIENT)
Dept: FAMILY MEDICINE CLINIC | Age: 43
End: 2018-08-30

## 2018-08-30 VITALS
DIASTOLIC BLOOD PRESSURE: 78 MMHG | BODY MASS INDEX: 35.37 KG/M2 | WEIGHT: 238.8 LBS | HEIGHT: 69 IN | SYSTOLIC BLOOD PRESSURE: 114 MMHG | HEART RATE: 87 BPM | RESPIRATION RATE: 16 BRPM

## 2018-08-30 DIAGNOSIS — E66.9 OBESITY (BMI 35.0-39.9 WITHOUT COMORBIDITY): Primary | ICD-10-CM

## 2018-08-30 NOTE — PROGRESS NOTES
Progress Note: Weekly Education Class in the [facility] Weight Loss Program   Is there anything that you or the patient needs to let Dr Alexy Ferguson know about? no  Over the past week, have you experienced any side-effects? no    Larissa Flynn is a 41 y.o. female who is enrolled in [facility] Weight Loss Program    Visit Vitals    Ht 5' 9\" (1.753 m)    Wt 238 lb 12.8 oz (108.3 kg)    BMI 35.26 kg/m2     Weight Metrics 8/30/2018 8/9/2018 8/9/2018 7/26/2018 7/5/2018 7/5/2018 6/22/2018   Weight 238 lb 12.8 oz - 243 lb 3.2 oz 234 lb 1.6 oz - 246 lb 4.8 oz 246 lb 12.8 oz   Waist Measure Inches - 40 - 39 39.5 - 39.5   Body Fat % - 41 - 41 41.4 - 41.4   BMI 35.26 kg/m2 - 35.91 kg/m2 34.57 kg/m2 - 36.37 kg/m2 36.45 kg/m2         Have you received any other medical care this week? no  If yes, where and for what? Have you had any change in your medications since your last visit? no  If yes what? Did you have any problems adhering to the program last week? no  If yes, please explain:       Eating Habits Over Last Week:  Did you take in 64 oz of non-caloric fluids? yes     Did you consume your 4 meal replacements each day?  yes       Physical Activity Over the Past Week:    Aerobic exercise: 240+ min  Resistance exercise: 0 workouts / week

## 2018-09-07 ENCOUNTER — CLINICAL SUPPORT (OUTPATIENT)
Dept: FAMILY MEDICINE CLINIC | Age: 43
End: 2018-09-07

## 2018-09-07 VITALS
WEIGHT: 241.1 LBS | DIASTOLIC BLOOD PRESSURE: 84 MMHG | SYSTOLIC BLOOD PRESSURE: 112 MMHG | BODY MASS INDEX: 35.6 KG/M2 | HEART RATE: 82 BPM

## 2018-09-07 DIAGNOSIS — E66.9 OBESITY (BMI 35.0-39.9 WITHOUT COMORBIDITY): Primary | ICD-10-CM

## 2018-09-07 RX ORDER — GUAIFENESIN AND PHENYLEPHRINE HCL 400; 10 MG/1; MG/1
TABLET ORAL
COMMUNITY

## 2018-09-07 RX ORDER — FISH OIL/DHA/EPA 1200-144MG
CAPSULE ORAL
COMMUNITY

## 2018-09-07 NOTE — PROGRESS NOTES
Progress Note: Weekly Education Class in the [facility]   Is there anything that you or the patient needs to let Dr Asia Clemens know about? no  Over the past week, have you experienced any side-effects? no    Sarina Tiwari is a 41 y.o. female who is enrolled in [facility] Program    Visit Vitals    Wt 241 lb 1.6 oz (109.4 kg)    BMI 35.6 kg/m2     Weight Metrics 9/7/2018 8/30/2018 8/30/2018 8/9/2018 8/9/2018 7/26/2018 7/5/2018   Weight 241 lb 1.6 oz - 238 lb 12.8 oz - 243 lb 3.2 oz 234 lb 1.6 oz -   Waist Measure Inches 38 38.5 - 40 - 39 39.5   Body Fat % 40.8 40.5 - 41 - 41 41.4   BMI 35.6 kg/m2 - 35.26 kg/m2 - 35.91 kg/m2 34.57 kg/m2 -         Have you received any other medical care this week? no  If yes, where and for what? Have you had any change in your medications since your last visit? no  If yes what? Did you have any problems adhering to the program last week? no  If yes, please explain:       Eating Habits Over Last Week:  Did you take in 64 oz of non-caloric fluids?  yes     Did you consume your 4 meal replacements each day? no  2 meal replacement       Physical Activity Over the Past Week:    Aerobic exercise: 0 min  Resistance exercise: 0 workouts / week

## 2018-09-20 ENCOUNTER — OFFICE VISIT (OUTPATIENT)
Dept: FAMILY MEDICINE CLINIC | Age: 43
End: 2018-09-20

## 2018-09-20 VITALS
BODY MASS INDEX: 35.89 KG/M2 | HEIGHT: 69 IN | DIASTOLIC BLOOD PRESSURE: 85 MMHG | SYSTOLIC BLOOD PRESSURE: 126 MMHG | RESPIRATION RATE: 16 BRPM | HEART RATE: 83 BPM | TEMPERATURE: 97.7 F | OXYGEN SATURATION: 98 % | WEIGHT: 242.3 LBS

## 2018-09-20 DIAGNOSIS — E66.01 SEVERE OBESITY (BMI 35.0-39.9): Primary | ICD-10-CM

## 2018-09-20 NOTE — PATIENT INSTRUCTIONS
Read: Me, Myself and I: 28 Days to Creative Self-Love. Body Mass Index: Care Instructions Your Care Instructions Body mass index (BMI) can help you see if your weight is raising your risk for health problems. It uses a formula to compare how much you weigh with how tall you are. · A BMI lower than 18.5 is considered underweight. · A BMI between 18.5 and 24.9 is considered healthy. · A BMI between 25 and 29.9 is considered overweight. A BMI of 30 or higher is considered obese. If your BMI is in the normal range, it means that you have a lower risk for weight-related health problems. If your BMI is in the overweight or obese range, you may be at increased risk for weight-related health problems, such as high blood pressure, heart disease, stroke, arthritis or joint pain, and diabetes. If your BMI is in the underweight range, you may be at increased risk for health problems such as fatigue, lower protection (immunity) against illness, muscle loss, bone loss, hair loss, and hormone problems. BMI is just one measure of your risk for weight-related health problems. You may be at higher risk for health problems if you are not active, you eat an unhealthy diet, or you drink too much alcohol or use tobacco products. Follow-up care is a key part of your treatment and safety. Be sure to make and go to all appointments, and call your doctor if you are having problems. It's also a good idea to know your test results and keep a list of the medicines you take. How can you care for yourself at home? · Practice healthy eating habits. This includes eating plenty of fruits, vegetables, whole grains, lean protein, and low-fat dairy. · If your doctor recommends it, get more exercise. Walking is a good choice. Bit by bit, increase the amount you walk every day. Try for at least 30 minutes on most days of the week. · Do not smoke. Smoking can increase your risk for health problems.  If you need help quitting, talk to your doctor about stop-smoking programs and medicines. These can increase your chances of quitting for good. · Limit alcohol to 2 drinks a day for men and 1 drink a day for women. Too much alcohol can cause health problems. If you have a BMI higher than 25 · Your doctor may do other tests to check your risk for weight-related health problems. This may include measuring the distance around your waist. A waist measurement of more than 40 inches in men or 35 inches in women can increase the risk of weight-related health problems. · Talk with your doctor about steps you can take to stay healthy or improve your health. You may need to make lifestyle changes to lose weight and stay healthy, such as changing your diet and getting regular exercise. If you have a BMI lower than 18.5 · Your doctor may do other tests to check your risk for health problems. · Talk with your doctor about steps you can take to stay healthy or improve your health. You may need to make lifestyle changes to gain or maintain weight and stay healthy, such as getting more healthy foods in your diet and doing exercises to build muscle. Where can you learn more? Go to http://matthew-ashley.info/. Enter S176 in the search box to learn more about \"Body Mass Index: Care Instructions. \" Current as of: October 13, 2016 Content Version: 11.4 © 9883-3286 Healthwise, Incorporated. Care instructions adapted under license by [employer] (which disclaims liability or warranty for this information). If you have questions about a medical condition or this instruction, always ask your healthcare professional. [place] [address] any warranty or liability for your use of this information.

## 2018-09-20 NOTE — MR AVS SNAPSHOT
[facility] [address] [facility] [place] [facility] [address] [id number] 
[phone] Patient: Benny Coker MRN: [id number] :1975 Visit Information Date & Time Provider Department Dept. Phone Encounter #  
 2018  3:30 PM Erlin Hicks [facility] [id number] Your Appointments 2018 14:35 AM  
METABOLIC PROGRAM 5 with HRMP WEEKLY CLASS AMA  
HR Metabolic Program ([facility]) Appt Note: wt check and bp check HR Metabolic Program ([address] [place] [address]) [phone] Upcoming Health Maintenance Date Due DTaP/Tdap/Td series (1 - Tdap) 1998 PAP AKA CERVICAL CYTOLOGY 3/12/2015 Influenza Age 5 to Adult 2018 Allergies as of 2018  Review Complete On: 2018 By: Maricarmen Ingram DO No Known Allergies Current Immunizations  Reviewed on 2018 No immunizations on file. Not reviewed this visit You Were Diagnosed With   
  
 Codes Comments Severe obesity (BMI 35.0-39.9) ([facility])    -  Primary ICD-10-CM: E66.01 
ICD-9-CM: 278.01 Vitals BP Pulse Temp Resp Height(growth percentile) Weight(growth percentile) 126/85 83 97.7 °F (36.5 °C) (Oral) 16 5' 9\" (1.753 m) 242 lb 4.8 oz (109.9 kg) SpO2 BMI OB Status Smoking Status 98% 35.78 kg/m2 IUD Never Smoker Vitals History BMI and BSA Data Body Mass Index Body Surface Area 35.78 kg/m 2 2.31 m 2 Your Updated Medication List  
  
   
This list is accurate as of 18 11:59 PM.  Always use your most recent med list.  
  
  
  
  
 fish oil-dha-epa 1,200-144-216 mg Cap Take  by mouth. MULTI-VITAMIN PO Take  by mouth. PROBIOTIC PO Take  by mouth. turmeric root extract 500 mg Cap Take  by mouth. Patient Instructions Read: Me, Myself and I: 28 Days to Creative Self-Love. Body Mass Index: Care Instructions Your Care Instructions Body mass index (BMI) can help you see if your weight is raising your risk for health problems. It uses a formula to compare how much you weigh with how tall you are. · A BMI lower than 18.5 is considered underweight. · A BMI between 18.5 and 24.9 is considered healthy. · A BMI between 25 and 29.9 is considered overweight. A BMI of 30 or higher is considered obese. If your BMI is in the normal range, it means that you have a lower risk for weight-related health problems. If your BMI is in the overweight or obese range, you may be at increased risk for weight-related health problems, such as high blood pressure, heart disease, stroke, arthritis or joint pain, and diabetes. If your BMI is in the underweight range, you may be at increased risk for health problems such as fatigue, lower protection (immunity) against illness, muscle loss, bone loss, hair loss, and hormone problems. BMI is just one measure of your risk for weight-related health problems. You may be at higher risk for health problems if you are not active, you eat an unhealthy diet, or you drink too much alcohol or use tobacco products. Follow-up care is a key part of your treatment and safety. Be sure to make and go to all appointments, and call your doctor if you are having problems. It's also a good idea to know your test results and keep a list of the medicines you take. How can you care for yourself at home? · Practice healthy eating habits. This includes eating plenty of fruits, vegetables, whole grains, lean protein, and low-fat dairy. · If your doctor recommends it, get more exercise. Walking is a good choice. Bit by bit, increase the amount you walk every day. Try for at least 30 minutes on most days of the week. · Do not smoke. Smoking can increase your risk for health problems.  If you need help quitting, talk to your doctor about stop-smoking programs and medicines. These can increase your chances of quitting for good. · Limit alcohol to 2 drinks a day for men and 1 drink a day for women. Too much alcohol can cause health problems. If you have a BMI higher than 25 · Your doctor may do other tests to check your risk for weight-related health problems. This may include measuring the distance around your waist. A waist measurement of more than 40 inches in men or 35 inches in women can increase the risk of weight-related health problems. · Talk with your doctor about steps you can take to stay healthy or improve your health. You may need to make lifestyle changes to lose weight and stay healthy, such as changing your diet and getting regular exercise. If you have a BMI lower than 18.5 · Your doctor may do other tests to check your risk for health problems. · Talk with your doctor about steps you can take to stay healthy or improve your health. You may need to make lifestyle changes to gain or maintain weight and stay healthy, such as getting more healthy foods in your diet and doing exercises to build muscle. Where can you learn more? Go to http://matthew-ashley.info/. Enter S176 in the search box to learn more about \"Body Mass Index: Care Instructions. \" Current as of: October 13, 2016 Content Version: 11.4 © 8332-7967 [employer]. Care instructions adapted under license by [employer] (which disclaims liability or warranty for this information). If you have questions about a medical condition or this instruction, always ask your healthcare professional. [place] [address] any warranty or liability for your use of this information. Patient Instructions History Introducing [address]! Dear Noah Osman: Thank you for requesting a [employer] account. Our records indicate that you already have an active [employer] account.   You can access your account anytime at [email or web address] Did you know that you can access your hospital and ER discharge instructions at any time in [employer]? You can also review all of your test results from your hospital stay or ER visit. Additional Information If you have questions, please visit the Frequently Asked Questions section of the [employer] website at [email or web address] Remember, [employer] is NOT to be used for urgent needs. For medical emergencies, dial 911. Now available from your iPhone and Android! Please provide this summary of care documentation to your next provider. If you have any questions after today's visit, please call [phone].

## 2018-09-20 NOTE — PROGRESS NOTES
[facility] Progress Note:  
F/up Physician Visit for the VLCD / LCD Program 
 
CC: Weight Management Nicola England is a 41 y.o. female who is here for her f/up physician visit for the [employer]. 
 
Weight History Ideal body weight: 145 lb 15.1 oz (66.2 kg) Adjusted ideal body weight: 184 lb 7.8 oz (83.7 kg) (Based on [employer]) Starting wt 260 lb 1/18/18 Lowest wt 234 lb 7/26/18 Currently at 242 lb Her triggers: 
Cooking on the weekend for her family Meetings at work Boredom Wt Readings from Last 50 Encounters:  
09/20/18 242 lb 4.8 oz (109.9 kg) 09/07/18 241 lb 1.6 oz (109.4 kg) 08/30/18 238 lb 12.8 oz (108.3 kg) 08/09/18 243 lb 3.2 oz (110.3 kg) 07/26/18 234 lb 1.6 oz (106.2 kg) 07/05/18 246 lb 4.8 oz (111.7 kg) 06/22/18 246 lb 12.8 oz (111.9 kg) 05/24/18 246 lb 9.6 oz (111.9 kg) 05/11/18 247 lb 9.6 oz (112.3 kg) 04/26/18 246 lb 3.2 oz (111.7 kg) 04/19/18 248 lb 1.6 oz (112.5 kg) 04/12/18 247 lb 4.8 oz (112.2 kg) 04/05/18 250 lb 4.8 oz (113.5 kg) 03/22/18 248 lb (112.5 kg) 03/15/18 249 lb 14.4 oz (113.4 kg) 03/09/18 249 lb 9.6 oz (113.2 kg) 03/01/18 251 lb 14.4 oz (114.3 kg) 02/22/18 248 lb 12.8 oz (112.9 kg) 02/16/18 248 lb 12.8 oz (112.9 kg) 02/08/18 251 lb 9.6 oz (114.1 kg) 02/01/18 255 lb 9.6 oz (115.9 kg) 01/25/18 259 lb 9.6 oz (117.8 kg) 01/18/18 260 lb (117.9 kg) Medications Currently Taking Outpatient Prescriptions Marked as Taking for the 9/20/18 encounter (Office Visit) with Christie Bobo, DO Medication Sig Dispense Refill  fish oil-dha-epa 1,200-144-216 mg cap Take  by mouth.  turmeric root extract 500 mg cap Take  by mouth.  Lactobacillus acidophilus (PROBIOTIC PO) Take  by mouth.  MULTIVITAMINS WITH FLUORIDE (MULTI-VITAMIN PO) Take  by mouth. Participation Have you been attending class on a regular basis? yes Review of Systems Since your last visit, have you experienced any complications? no If yes, please list: no  
 
Are you taking an appetite suppressant? no 
If so: Do you need a refill? no 
Are you experiencine any Chest Pain, Palpitations or Dizziness? no 
 
BP Readings from Last 3 Encounters:  
09/20/18 126/85  
09/07/18 112/84  
08/30/18 114/78 Have you received any other medical care this week? no  If yes, where and for what? Have you discontinued or changed any medicine or dose of your medicine(s) since your last visit with Dr Selena Webster? no If yes, where and for what? Diet How many ounces of calorie-free liquids did you consume each day? 64 oz How many meal replacements did you take each day? 4 and small snacka Did you have any problems adhering to the program?  no If yes, please explain: 
 
 
Exercise Aerobic exercise: 90 min Resistance exercise: 0 workouts / week Any discomfort?  no If yes, where? Objective Visit Vitals  /85  Pulse 83  Temp 97.7 °F (36.5 °C) (Oral)  Resp 16  
 Ht 5' 9\" (1.753 m)  Wt 242 lb 4.8 oz (109.9 kg)  SpO2 98%  BMI 35.78 kg/m2 No LMP recorded. Patient is not currently having periods (Reason: IUD). Physical Exam 
Appearance: Obese, A&O x 3, NAD HEENT:  NC/AT, EOMI, PERRL, No scleral icterus Assessment / Plan Encounter Diagnosis Name Primary?  Severe obesity (BMI 35.0-39.9) ([facility].) Yes 1. Weight management  
 needs improvement Progress was reviewed with patient Goal(s) for next appointment: 
 Stay on the program 
 
 
 
2. Labs Latest results reviewed with patient Routine monitoring labs ordered no 
  -Lab slip given to pt for off-site [facility] labs no 
 
10 minutes of the 15 minutes face to face time with Xiang Marcano consisted of counseling & coordinating and/or discussing treatment plans in reference to her The encounter diagnosis was Severe obesity (BMI 35.0-39.9) ([facility]

## 2018-10-19 ENCOUNTER — CLINICAL SUPPORT (OUTPATIENT)
Dept: FAMILY MEDICINE CLINIC | Age: 43
End: 2018-10-19

## 2018-10-19 VITALS
RESPIRATION RATE: 16 BRPM | WEIGHT: 239 LBS | SYSTOLIC BLOOD PRESSURE: 105 MMHG | HEART RATE: 78 BPM | BODY MASS INDEX: 35.4 KG/M2 | DIASTOLIC BLOOD PRESSURE: 69 MMHG | HEIGHT: 69 IN

## 2018-10-19 DIAGNOSIS — E66.9 OBESITY (BMI 30.0-34.9): Primary | ICD-10-CM

## 2018-10-19 NOTE — PROGRESS NOTES
Progress Note: Weekly Education Class in the [facility] Weight Loss Program   Is there anything that you or the patient needs to let Dr Tori Carrel know about? no  Over the past week, have you experienced any side-effects? no    Nicolás Powell is a 41 y.o. female who is enrolled in [facility]    [facility] (1.753 m)   BMI 35.78 kg/m²     Weight Metrics 10/19/2018 9/20/2018 9/20/2018 9/7/2018 8/30/2018 8/30/2018 8/9/2018   Weight - - 242 lb 4.8 oz 241 lb 1.6 oz - 238 lb 12.8 oz -   Waist Measure Inches - 38.5 - 38 38.5 - 40   Body Fat % - 41 - 40.8 40.5 - 41   BMI 35.78 kg/m2 - 35.78 kg/m2 35.6 kg/m2 - 35.26 kg/m2 -         Have you received any other medical care this week? no  If yes, where and for what? Have you had any change in your medications since your last visit? no  If yes what? Did you have any problems adhering to the program last week? yes  If yes, please explain did nt have product:       Eating Habits Over Last Week:  Did you take in 64 oz of non-caloric fluids?  yes     Did you consume your 4 meal replacements each day? no out of product      Physical Activity Over the Past Week:    Aerobic exercise: 0 min  Resistance exercise: 0 workouts / week

## 2018-10-25 ENCOUNTER — OFFICE VISIT (OUTPATIENT)
Dept: FAMILY MEDICINE CLINIC | Age: 43
End: 2018-10-25

## 2018-10-25 VITALS
HEART RATE: 75 BPM | HEIGHT: 69 IN | WEIGHT: 238.9 LBS | DIASTOLIC BLOOD PRESSURE: 82 MMHG | BODY MASS INDEX: 35.38 KG/M2 | SYSTOLIC BLOOD PRESSURE: 130 MMHG

## 2018-10-25 DIAGNOSIS — E66.01 SEVERE OBESITY (HCC): Primary | ICD-10-CM

## 2018-10-25 NOTE — PROGRESS NOTES
[facility] Weight Loss Program Progress Note:   F/up Physician Visit for the VLCD / LCD Program    CC: Weight Management    Ruben Wooten is a 41 y.o. female who is here for her f/up physician visit for the [facility].    Weight History  Ideal body weight: 145 lb 15.1 oz (66.2 kg)  Adjusted ideal body weight: 183 lb 2 oz (83.1 kg) (Based on [address] [facility] [address])    Wt Readings from Last 10 Encounters:   10/25/18 238 lb 14.4 oz (108.4 kg)   10/19/18 239 lb (108.4 kg)   09/20/18 242 lb 4.8 oz (109.9 kg)   09/07/18 241 lb 1.6 oz (109.4 kg)   08/30/18 238 lb 12.8 oz (108.3 kg)   08/09/18 243 lb 3.2 oz (110.3 kg)   07/26/18 234 lb 1.6 oz (106.2 kg)   07/05/18 246 lb 4.8 oz (111.7 kg)   06/22/18 246 lb 12.8 oz (111.9 kg)   05/24/18 246 lb 9.6 oz (111.9 kg)       Weight Metrics 10/25/2018 10/25/2018 10/19/2018 10/19/2018 9/20/2018 9/20/2018 9/7/2018   Weight - 238 lb 14.4 oz - 239 lb - 242 lb 4.8 oz 241 lb 1.6 oz   Waist Measure Inches 37.5 - 37.75 - 38.5 - 38   Body Fat % 40.6 - 40.5 - 41 - 40.8   BMI - 35.28 kg/m2 - 35.29 kg/m2 - 35.78 kg/m2 35.6 kg/m2         Medications Currently Taking  Current Outpatient Medications   Medication Sig Dispense Refill    fish oil-dha-epa 1,200-144-216 mg cap Take  by mouth.  turmeric root extract 500 mg cap Take  by mouth.  Lactobacillus acidophilus (PROBIOTIC PO) Take  by mouth.  MULTIVITAMINS WITH FLUORIDE (MULTI-VITAMIN PO) Take  by mouth. Participation   Have you been attending class on a regular basis? yes    Review of Systems  Since your last visit, have you experienced any complications? no    Are you taking an appetite suppressant? no      BP Readings from Last 3 Encounters:   10/25/18 130/82   10/19/18 105/69   09/20/18 126/85       Have you received any other medical care this week? no      Have you discontinued or changed any medicine or dose of your medicine(s) since your last visit with Dr Gaetano Verdugo?  no Diet  How many ounces of calorie-free liquids did you consume each day?  96 oz    How many meal replacements did you take each day? 4    Did you have any problems adhering to the program?  no     Feeling better, DEONTE is improved. Feeling hungry after working out      Exercise  Aerobic exercise: 60 min  Resistance exercise: 2 workouts / week  Any discomfort?  no        Objective  Visit Vitals  /82   Pulse 75   Ht 5' 9\" (1.753 m)   Wt 238 lb 14.4 oz (108.4 kg)   BMI 35.28 kg/m²     No LMP recorded. Patient is not currently having periods (Reason: IUD). Physical Exam  Appearance: Severely obese, A&O x 3, NAD  HEENT:  NC/AT, EOMI, PERRL, No scleral icterus    Assessment / Plan    Encounter Diagnosis   Name Primary?  [facility] Yes       1. Weight management    improved   Progress was reviewed with patient   Goal(s) for next appointment:   Keep up the good work        2. Labs    Latest results reviewed with patient   Routine monitoring labs ordered yes    -Lab slip given to pt for off-site [facility] labs no    10 minutes of the 15 minutes face to face time with Aixa James consisted of counseling & coordinating and/or discussing treatment plans in reference to her The encounter diagnosis was Severe obesity ([facility]

## 2018-10-25 NOTE — PATIENT INSTRUCTIONS
Keep up the good work. Body Mass Index: Care Instructions  Your Care Instructions    Body mass index (BMI) can help you see if your weight is raising your risk for health problems. It uses a formula to compare how much you weigh with how tall you are. · A BMI lower than 18.5 is considered underweight. · A BMI between 18.5 and 24.9 is considered healthy. · A BMI between 25 and 29.9 is considered overweight. A BMI of 30 or higher is considered obese. If your BMI is in the normal range, it means that you have a lower risk for weight-related health problems. If your BMI is in the overweight or obese range, you may be at increased risk for weight-related health problems, such as high blood pressure, heart disease, stroke, arthritis or joint pain, and diabetes. If your BMI is in the underweight range, you may be at increased risk for health problems such as fatigue, lower protection (immunity) against illness, muscle loss, bone loss, hair loss, and hormone problems. BMI is just one measure of your risk for weight-related health problems. You may be at higher risk for health problems if you are not active, you eat an unhealthy diet, or you drink too much alcohol or use tobacco products. Follow-up care is a key part of your treatment and safety. Be sure to make and go to all appointments, and call your doctor if you are having problems. It's also a good idea to know your test results and keep a list of the medicines you take. How can you care for yourself at home? · Practice healthy eating habits. This includes eating plenty of fruits, vegetables, whole grains, lean protein, and low-fat dairy. · If your doctor recommends it, get more exercise. Walking is a good choice. Bit by bit, increase the amount you walk every day. Try for at least 30 minutes on most days of the week. · Do not smoke. Smoking can increase your risk for health problems.  If you need help quitting, talk to your doctor about stop-smoking programs and medicines. These can increase your chances of quitting for good. · Limit alcohol to 2 drinks a day for men and 1 drink a day for women. Too much alcohol can cause health problems. If you have a BMI higher than 25  · Your doctor may do other tests to check your risk for weight-related health problems. This may include measuring the distance around your waist. A waist measurement of more than 40 inches in men or 35 inches in women can increase the risk of weight-related health problems. · Talk with your doctor about steps you can take to stay healthy or improve your health. You may need to make lifestyle changes to lose weight and stay healthy, such as changing your diet and getting regular exercise. If you have a BMI lower than 18.5  · Your doctor may do other tests to check your risk for health problems. · Talk with your doctor about steps you can take to stay healthy or improve your health. You may need to make lifestyle changes to gain or maintain weight and stay healthy, such as getting more healthy foods in your diet and doing exercises to build muscle. Where can you learn more? Go to http://matthew-ashley.info/. Enter S176 in the search box to learn more about \"Body Mass Index: Care Instructions. \"  Current as of: October 13, 2016  Content Version: 11.4  © 4588-5176 Healthwise, Incorporated. Care instructions adapted under license by [employer] (which disclaims liability or warranty for this information). If you have questions about a medical condition or this instruction, always ask your healthcare professional. [place] [address] any warranty or liability for your use of this information.

## 2018-10-26 LAB
A-G RATIO,AGRAT: 1.5 RATIO (ref 1.1–2.6)
ALBUMIN SERPL-MCNC: 4.6 G/DL (ref 3.5–5)
ALP SERPL-CCNC: 76 U/L (ref 25–115)
ALT SERPL-CCNC: 14 U/L (ref 5–40)
ANION GAP SERPL CALC-SCNC: 19 MMOL/L
AST SERPL W P-5'-P-CCNC: 16 U/L (ref 10–37)
BILIRUB SERPL-MCNC: 0.3 MG/DL (ref 0.2–1.2)
BUN SERPL-MCNC: 17 MG/DL (ref 6–22)
CALCIUM SERPL-MCNC: 9.2 MG/DL (ref 8.4–10.5)
CHLORIDE SERPL-SCNC: 100 MMOL/L (ref 98–110)
CO2 SERPL-SCNC: 21 MMOL/L (ref 20–32)
CREAT SERPL-MCNC: 0.6 MG/DL (ref 0.5–1.2)
GFRAA, 66117: >60
GFRNA, 66118: >60
GLOBULIN,GLOB: 3.1 G/DL (ref 2–4)
GLUCOSE SERPL-MCNC: 84 MG/DL (ref 70–99)
POTASSIUM SERPL-SCNC: 4.5 MMOL/L (ref 3.5–5.5)
PROT SERPL-MCNC: 7.7 G/DL (ref 6.4–8.3)
SODIUM SERPL-SCNC: 140 MMOL/L (ref 133–145)
URATE SERPL-MCNC: 3.4 MG/DL (ref 2.2–7.7)

## 2018-11-09 ENCOUNTER — CLINICAL SUPPORT (OUTPATIENT)
Dept: FAMILY MEDICINE CLINIC | Age: 43
End: 2018-11-09

## 2018-11-09 VITALS
SYSTOLIC BLOOD PRESSURE: 123 MMHG | WEIGHT: 242.5 LBS | BODY MASS INDEX: 35.92 KG/M2 | DIASTOLIC BLOOD PRESSURE: 83 MMHG | HEIGHT: 69 IN

## 2018-11-09 DIAGNOSIS — E66.9 OBESITY (BMI 30.0-34.9): Primary | ICD-10-CM

## 2018-11-09 NOTE — PROGRESS NOTES
Progress Note: Weekly Education Class in the [facility] Weight Loss Program   Is there anything that you or the patient needs to let Dr Jessica Aguilar know about? no  Over the past week, have you experienced any side-effects? no    Guy Gerber is a 41 y.o. female who is enrolled in [facility]    Visit Vitals  /83 (BP 1 Location: Left arm, BP Patient Position: At rest)   Ht 5' 9\" (1.753 m)   Wt 242 lb 8 oz (110 kg)   BMI 35.81 kg/m²     Weight Metrics 11/9/2018 11/9/2018 10/25/2018 10/25/2018 10/19/2018 10/19/2018 9/20/2018   Weight - 242 lb 8 oz - 238 lb 14.4 oz - 239 lb -   Waist Measure Inches 38.5 - 37.5 - 37.75 - 38.5   Body Fat % 41 - 40.6 - 40.5 - 41   BMI - 35.81 kg/m2 - 35.28 kg/m2 - 35.29 kg/m2 -         Have you received any other medical care this week? no  If yes, where and for what? Have you had any change in your medications since your last visit? no  If yes what? Did you have any problems adhering to the program last week? no  If yes, please explain:       Eating Habits Over Last Week:  Did you take in 64 oz of non-caloric fluids? yes     Did you consume your 4 meal replacements each day?  yes       Physical Activity Over the Past Week:    Aerobic exercise: 0 min  Resistance exercise: 0 workouts / week

## 2018-11-16 ENCOUNTER — CLINICAL SUPPORT (OUTPATIENT)
Dept: FAMILY MEDICINE CLINIC | Age: 43
End: 2018-11-16

## 2018-11-16 VITALS
HEART RATE: 71 BPM | DIASTOLIC BLOOD PRESSURE: 81 MMHG | WEIGHT: 239.8 LBS | BODY MASS INDEX: 35.52 KG/M2 | HEIGHT: 69 IN | SYSTOLIC BLOOD PRESSURE: 133 MMHG

## 2018-11-16 DIAGNOSIS — E66.9 OBESITY (BMI 30-39.9): Primary | ICD-10-CM

## 2018-11-16 NOTE — PROGRESS NOTES
Progress Note: Weekly Education Class in the [facility] Weight Loss Program   Is there anything that you or the patient needs to let Dr Ozell Opitz know about? no  Over the past week, have you experienced any side-effects? no    Leandro Dempsey is a 41 y.o. female who is enrolled in [facility] Loss Program    There were no vitals taken for this visit. Weight Metrics 11/9/2018 11/9/2018 10/25/2018 10/25/2018 10/19/2018 10/19/2018 9/20/2018   Weight - 242 lb 8 oz - 238 lb 14.4 oz - 239 lb -   Waist Measure Inches 38.5 - 37.5 - 37.75 - 38.5   Body Fat % 41 - 40.6 - 40.5 - 41   BMI - 35.81 kg/m2 - 35.28 kg/m2 - 35.29 kg/m2 -         Have you received any other medical care this week? no  If yes, where and for what? Have you had any change in your medications since your last visit? no  If yes what? Did you have any problems adhering to the program last week? no  If yes, please explain:       Eating Habits Over Last Week:  Did you take in 64 oz of non-caloric fluids? yes     Did you consume your 4 meal replacements each day?  yes       Physical Activity Over the Past Week:    Aerobic exercise: 180 min  Resistance exercise: 1 workouts / week

## 2018-11-30 ENCOUNTER — CLINICAL SUPPORT (OUTPATIENT)
Dept: FAMILY MEDICINE CLINIC | Age: 43
End: 2018-11-30

## 2018-11-30 VITALS
BODY MASS INDEX: 36.27 KG/M2 | HEART RATE: 80 BPM | WEIGHT: 244.9 LBS | DIASTOLIC BLOOD PRESSURE: 70 MMHG | SYSTOLIC BLOOD PRESSURE: 121 MMHG | RESPIRATION RATE: 16 BRPM | HEIGHT: 69 IN

## 2018-11-30 DIAGNOSIS — E66.9 OBESITY (BMI 30-39.9): Primary | ICD-10-CM

## 2018-11-30 NOTE — PROGRESS NOTES
Progress Note: Weekly Education Class in the [facility] Loss Program   Is there anything that you or the patient needs to let Dr Tori Carrel know about? no  Over the past week, have you experienced any side-effects? no    Nicolás Powell is a 41 y.o. female who is enrolled in [facility]    There were no vitals taken for this visit. Weight Metrics 11/30/2018 11/16/2018 11/9/2018 11/9/2018 10/25/2018 10/25/2018 10/19/2018   Weight - 239 lb 12.8 oz - 242 lb 8 oz - 238 lb 14.4 oz -   Waist Measure Inches 38 37 38.5 - 37.5 - 37.75   Body Fat % 41.2 40.6 41 - 40.6 - 40.5   BMI - 35.41 kg/m2 - 35.81 kg/m2 - 35.28 kg/m2 -         Have you received any other medical care this week? no  If yes, where and for what? Have you had any change in your medications since your last visit? no  If yes what? Did you have any problems adhering to the program last week? yes  If yes, please explain:       Eating Habits Over Last Week:  Did you take in 64 oz of non-caloric fluids?  yes     Did you consume your 4 meal replacements each day? no only 2 meal replacement       Physical Activity Over the Past Week:    Aerobic exercise: 0 min  Resistance exercise: 0 workouts / week

## 2018-12-21 ENCOUNTER — CLINICAL SUPPORT (OUTPATIENT)
Dept: FAMILY MEDICINE CLINIC | Age: 43
End: 2018-12-21

## 2018-12-21 DIAGNOSIS — E66.9 OBESITY (BMI 30-39.9): Primary | ICD-10-CM

## 2018-12-21 NOTE — PROGRESS NOTES
Progress Note: Weekly Education Class in the [facility] Loss Program   Is there anything that you or the patient needs to let Dr Mandy Norris know about? no  Over the past week, have you experienced any side-effects? no    Leticia Mejia is a 41 y.o. female who is enrolled in [facility]    There were no vitals taken for this visit. Weight Metrics 12/21/2018 11/30/2018 11/16/2018 11/9/2018 11/9/2018 10/25/2018 10/25/2018   Weight - 244 lb 14.4 oz 239 lb 12.8 oz - 242 lb 8 oz - 238 lb 14.4 oz   Waist Measure Inches 38 38 37 38.5 - 37.5 -   Body Fat % 41.2 41.2 40.6 41 - 40.6 -   BMI - 36.17 kg/m2 35.41 kg/m2 - 35.81 kg/m2 - 35.28 kg/m2         Have you received any other medical care this week? no  If yes, where and for what? Have you had any change in your medications since your last visit? no  If yes what? Did you have any problems adhering to the program last week? no  If yes, please explain:       Eating Habits Over Last Week:  Did you take in 64 oz of non-caloric fluids?  yes     Did you consume your 4 meal replacements each day?2-3      Physical Activity Over the Past Week:    Aerobic exercise: 0 min  Resistance exercise: 0 workouts / week

## 2019-01-04 ENCOUNTER — CLINICAL SUPPORT (OUTPATIENT)
Dept: FAMILY MEDICINE CLINIC | Age: 44
End: 2019-01-04

## 2019-01-04 VITALS — BODY MASS INDEX: 36.56 KG/M2 | WEIGHT: 246.8 LBS | HEIGHT: 69 IN

## 2019-01-04 DIAGNOSIS — E66.9 OBESITY (BMI 30-39.9): Primary | ICD-10-CM

## 2019-01-04 NOTE — PROGRESS NOTES
Progress Note: Weekly Education Class in the [facility] Loss [facility]   Is there anything that you or the patient needs to let Dr Glenda Mariee know about? no  Over the past week, have you experienced any side-effects? no    Barry  is a 41 y.o. female who is enrolled in [facility]    Visit [facility] 5' 9\" (1.753 m)   Wt 246 lb 12.8 oz (111.9 kg)   BMI 36.45 kg/m²     Weight Metrics 1/4/2019 12/21/2018 11/30/2018 11/16/2018 11/9/2018 11/9/2018 10/25/2018   Weight 246 lb 12.8 oz - 244 lb 14.4 oz 239 lb 12.8 oz - 242 lb 8 oz -   Waist Measure Inches 37.5 38 38 37 38.5 - 37.5   Body Fat % 41.5 41.2 41.2 40.6 41 - 40.6   BMI 36.45 kg/m2 - 36.17 kg/m2 35.41 kg/m2 - 35.81 kg/m2 -         Have you received any other medical care this week? no  If yes, where and for what? Have you had any change in your medications since your last visit? no  If yes what? Did you have any problems adhering to the program last week? no  If yes, please explain:       Eating Habits Over Last Week:  Did you take in 64 oz of non-caloric fluids? yes     Did you consume your 4 meal replacements each day?  yes       Physical Activity Over the Past Week:    Aerobic exercise: 0 min  Resistance exercise: 0 workouts / week

## 2019-01-17 ENCOUNTER — OFFICE VISIT (OUTPATIENT)
Dept: FAMILY MEDICINE CLINIC | Age: 44
End: 2019-01-17

## 2019-01-17 VITALS
HEIGHT: 69 IN | OXYGEN SATURATION: 98 % | WEIGHT: 239.4 LBS | TEMPERATURE: 97.3 F | DIASTOLIC BLOOD PRESSURE: 69 MMHG | SYSTOLIC BLOOD PRESSURE: 112 MMHG | RESPIRATION RATE: 16 BRPM | HEART RATE: 92 BPM | BODY MASS INDEX: 35.46 KG/M2

## 2019-01-17 DIAGNOSIS — E66.01 SEVERE OBESITY (HCC): ICD-10-CM

## 2019-01-17 DIAGNOSIS — K21.9 GASTROESOPHAGEAL REFLUX DISEASE WITHOUT ESOPHAGITIS: ICD-10-CM

## 2019-01-17 DIAGNOSIS — E66.01 SEVERE OBESITY (HCC): Primary | ICD-10-CM

## 2019-01-17 NOTE — PATIENT INSTRUCTIONS
I'm really excited to hear about all your strategies. Keep up the great job! #1 Just show up and do the work - stick to the plan. You are remodeling your body even when your weight is not changing. #2 Decide your theme for 2019. Body Mass Index: Care Instructions Your Care Instructions Body mass index (BMI) can help you see if your weight is raising your risk for health problems. It uses a formula to compare how much you weigh with how tall you are. · A BMI lower than 18.5 is considered underweight. · A BMI between 18.5 and 24.9 is considered healthy. · A BMI between 25 and 29.9 is considered overweight. A BMI of 30 or higher is considered obese. If your BMI is in the normal range, it means that you have a lower risk for weight-related health problems. If your BMI is in the overweight or obese range, you may be at increased risk for weight-related health problems, such as high blood pressure, heart disease, stroke, arthritis or joint pain, and diabetes. If your BMI is in the underweight range, you may be at increased risk for health problems such as fatigue, lower protection (immunity) against illness, muscle loss, bone loss, hair loss, and hormone problems. BMI is just one measure of your risk for weight-related health problems. You may be at higher risk for health problems if you are not active, you eat an unhealthy diet, or you drink too much alcohol or use tobacco products. Follow-up care is a key part of your treatment and safety. Be sure to make and go to all appointments, and call your doctor if you are having problems. It's also a good idea to know your test results and keep a list of the medicines you take. How can you care for yourself at home? · Practice healthy eating habits. This includes eating plenty of fruits, vegetables, whole grains, lean protein, and low-fat dairy. · If your doctor recommends it, get more exercise.  Walking is a good choice. Bit by bit, increase the amount you walk every day. Try for at least 30 minutes on most days of the week. · Do not smoke. Smoking can increase your risk for health problems. If you need help quitting, talk to your doctor about stop-smoking programs and medicines. These can increase your chances of quitting for good. · Limit alcohol to 2 drinks a day for men and 1 drink a day for women. Too much alcohol can cause health problems. If you have a BMI higher than 25 · Your doctor may do other tests to check your risk for weight-related health problems. This may include measuring the distance around your waist. A waist measurement of more than 40 inches in men or 35 inches in women can increase the risk of weight-related health problems. · Talk with your doctor about steps you can take to stay healthy or improve your health. You may need to make lifestyle changes to lose weight and stay healthy, such as changing your diet and getting regular exercise. If you have a BMI lower than 18.5 · Your doctor may do other tests to check your risk for health problems. · Talk with your doctor about steps you can take to stay healthy or improve your health. You may need to make lifestyle changes to gain or maintain weight and stay healthy, such as getting more healthy foods in your diet and doing exercises to build muscle. Where can you learn more? Go to http://matthew-ashley.info/. Enter S176 in the search box to learn more about \"Body Mass Index: Care Instructions. \" Current as of: October 13, 2016 Content Version: 11.4 © 7497-2172 Healthwise, [employer]. Care instructions adapted under license by [employer] (which disclaims liability or warranty for this information). If you have questions about a medical condition or this instruction, always ask your healthcare professional. [place] [address] any warranty or liability for your use of this information.

## 2019-01-17 NOTE — PROGRESS NOTES
[facility] Progress Note:  
F/up Physician Visit for the VLCD / LCD Program 
 
CC: Weight Management Juanito Olmos is a 41 y.o. female who is here for her f/up physician visit for the [employer]. 
 
Weight History Ideal body weight: 145 lb 15.1 oz (66.2 kg) Adjusted ideal body weight: 183 lb 5.2 oz (83.2 kg) (Based on [employer]) Wt Readings from Last 10 Encounters:  
01/17/19 239 lb 6.4 oz (108.6 kg) 01/04/19 246 lb 12.8 oz (111.9 kg)  
11/30/18 244 lb 14.4 oz (111.1 kg)  
11/16/18 239 lb 12.8 oz (108.8 kg) 11/09/18 242 lb 8 oz (110 kg) 10/25/18 238 lb 14.4 oz (108.4 kg) 10/19/18 239 lb (108.4 kg) 09/20/18 242 lb 4.8 oz (109.9 kg) 09/07/18 241 lb 1.6 oz (109.4 kg) 08/30/18 238 lb 12.8 oz (108.3 kg) Weight Metrics 1/17/2019 1/17/2019 1/4/2019 12/21/2018 11/30/2018 11/16/2018 11/9/2018 Weight - 239 lb 6.4 oz 246 lb 12.8 oz - 244 lb 14.4 oz 239 lb 12.8 oz - Waist Measure Inches 37 - 37.5 38 38 37 38.5 Body Fat % 40.6 - 41.5 41.2 41.2 40.6 41 BMI - 35.35 kg/m2 36.45 kg/m2 - 36.17 kg/m2 35.41 kg/m2 - Medications Currently Taking Outpatient Medications Marked as Taking for the 1/17/19 encounter (Office Visit) with Marilu Solorzano, DO Medication Sig Dispense Refill  OTHER  fish oil-dha-epa 1,200-144-216 mg cap Take  by mouth.  turmeric root extract 500 mg cap Take  by mouth.  Lactobacillus acidophilus (PROBIOTIC PO) Take  by mouth.  MULTIVITAMINS WITH FLUORIDE (MULTI-VITAMIN PO) Take  by mouth. Participation Have you been attending class on a regular basis? yes Review of Systems Since your last visit, have you experienced any complications? no If yes, please list: no 
 
Are you taking an appetite suppressant? no 
If so: Do you need a refill? no 
Are you experiencine any Chest Pain, Palpitations or Dizziness? no 
 
BP Readings from Last 3 Encounters:  
01/17/19 112/69 11/30/18 121/70  
11/16/18 133/81 Have you received any other medical care this week? no  If yes, where and for what? Have you discontinued or changed any medicine or dose of your medicine(s) since your last visit with Dr Harish Yoo? no If yes, where and for what? Diet How many ounces of calorie-free liquids did you consume each day?  96 oz How many meal replacements did you take each day? 3-4 meal replacement Did you have any problems adhering to the program?  no If yes, please explain: 
 
 
Exercise Aerobic exercise: 0 min Resistance exercise: 0 workouts / week Objective Visit Vitals /69 Pulse 92 Temp 97.3 °F (36.3 °C) Resp 16 Ht 5' 9\" (1.753 m) Wt 239 lb 6.4 oz (108.6 kg) SpO2 98% BMI 35.35 kg/m² No LMP recorded. Patient is not currently having periods (Reason: IUD). Physical Exam 
Appearance: Severely obese, A&O x 3, NAD HEENT:  NC/AT, EOMI, PERRL, No scleral icterus Assessment / Plan Encounter Diagnoses Name Primary?  Severe obesity ([facility] [address] Yes  Gastroesophageal reflux disease without esophagitis 1. Weight management  
 stable Progress was reviewed with patient Goal(s) for next appointment: 
 See patient instructions 2. Labs Latest results reviewed with patient Routine monitoring labs ordered yes 
  -Lab slip given to pt for off-site [facility] labs no 
 
10 minutes of the 15 minutes face to face time with Malia English consisted of counseling & coordinating and/or discussing treatment plans in reference to her The primary encounter diagnosis was Severe obesity ([facility] [address] A diagnosis of Gastroesophageal reflux disease without esophagitis was also pertinent to this visit.

## 2019-02-01 ENCOUNTER — CLINICAL SUPPORT (OUTPATIENT)
Dept: FAMILY MEDICINE CLINIC | Age: 44
End: 2019-02-01

## 2019-02-01 VITALS — HEIGHT: 69 IN | WEIGHT: 246.7 LBS | BODY MASS INDEX: 36.54 KG/M2

## 2019-02-01 DIAGNOSIS — E66.9 OBESITY (BMI 30-39.9): Primary | ICD-10-CM

## 2019-02-01 NOTE — PROGRESS NOTES
Progress Note: Weekly Education Class in the [facility] Loss [facility]   Is there anything that you or the patient needs to let Dr Fernie Shah know about? yes  Over the past week, have you experienced any side-effects? yes    Brittany Carrizales is a 41 y.o. female who is enrolled in [facility] Loss [facility]    There were no vitals taken for this visit. Weight Metrics 1/17/2019 1/17/2019 1/4/2019 12/21/2018 11/30/2018 11/16/2018 11/9/2018   Weight - 239 lb 6.4 oz 246 lb 12.8 oz - 244 lb 14.4 oz 239 lb 12.8 oz -   Waist Measure Inches 37 - 37.5 38 38 37 38.5   Body Fat % 40.6 - 41.5 41.2 41.2 40.6 41   BMI - 35.35 kg/m2 36.45 kg/m2 - 36.17 kg/m2 35.41 kg/m2 -         Have you received any other medical care this week? no  If yes, where and for what? Have you had any change in your medications since your last visit? no  If yes what? Did you have any problems adhering to the program last week? no  If yes, please explain:       Eating Habits Over Last Week:  Did you take in 64 oz of non-caloric fluids?  yes     Did you consume your 4 meal replacements each day? no       Physical Activity Over the Past Week:    Aerobic exercise: 45 min   Resistance exercise: 0 workouts / week

## 2019-02-08 ENCOUNTER — CLINICAL SUPPORT (OUTPATIENT)
Dept: FAMILY MEDICINE CLINIC | Age: 44
End: 2019-02-08

## 2019-02-08 VITALS — HEIGHT: 69 IN | BODY MASS INDEX: 35.37 KG/M2 | WEIGHT: 238.8 LBS

## 2019-02-08 DIAGNOSIS — E66.9 OBESITY (BMI 30-39.9): Primary | ICD-10-CM

## 2019-02-08 NOTE — PROGRESS NOTES
Progress Note: Weekly Education Class in the [facility] Loss Program   Is there anything that you or the patient needs to let Dr Tanya Londono know about? no  Over the past week, have you experienced any side-effects? no    Geovany Mejia is a 41 y.o. female who is enrolled in [facility]    There were no vitals taken for this visit. Weight Metrics 2/1/2019 1/17/2019 1/17/2019 1/4/2019 12/21/2018 11/30/2018 11/16/2018   Weight 246 lb 11.2 oz - 239 lb 6.4 oz 246 lb 12.8 oz - 244 lb 14.4 oz 239 lb 12.8 oz   Waist Measure Inches 38 37 - 37.5 38 38 37   Body Fat % 41.5 40.6 - 41.5 41.2 41.2 40.6   BMI 36.43 kg/m2 - 35.35 kg/m2 36.45 kg/m2 - 36.17 kg/m2 35.41 kg/m2         Have you received any other medical care this week? no  If yes, where and for what? Have you had any change in your medications since your last visit? no  If yes what? Did you have any problems adhering to the program last week? no  If yes, please explain:       Eating Habits Over Last Week:  Did you take in 64 oz of non-caloric fluids? yes     Did you consume your 4 meal replacements each day?  yes       Physical Activity Over the Past Week:    Aerobic exercise: 180 min  Resistance exercise: 2 workouts / week

## 2019-03-15 ENCOUNTER — OFFICE VISIT (OUTPATIENT)
Dept: SURGERY | Age: 44
End: 2019-03-15

## 2019-03-15 VITALS
DIASTOLIC BLOOD PRESSURE: 73 MMHG | RESPIRATION RATE: 20 BRPM | WEIGHT: 247 LBS | SYSTOLIC BLOOD PRESSURE: 113 MMHG | BODY MASS INDEX: 36.58 KG/M2 | HEART RATE: 92 BPM | TEMPERATURE: 96.3 F | HEIGHT: 69 IN

## 2019-03-15 DIAGNOSIS — E66.01 MORBID OBESITY (HCC): Primary | ICD-10-CM

## 2019-03-15 DIAGNOSIS — E66.01 MORBID OBESITY (HCC): ICD-10-CM

## 2019-03-15 NOTE — PROGRESS NOTES
Initial Consultation for Weight Loss    Devika Yates is a 41 y.o. female who comes into the office today for initial consultation for the options for the treatment of obesity. The patient initially identified obesity at the age of early 21's and at age 27 weighed 150 lbs. She has tried a variety of unsupervised weight-loss attempts including self imposed, Weight Watchers and [facility], but has yet to meet with lasting success. Maximum weight lost on a diet is about 40 lbs, but that the weight loss always seems to return. Today, the patient is  Height: 5' 9\" (175.3 cm) tall, Weight: 112 kg (247 lb) lbs for a Body mass index is 36.48 kg/m². It is due to the patient's obesity, which is further complicated by no significant previous medical problems  that the patient is now seeking out medically supervised VLCD.       Ideal body weight: 66.2 kg (145 lb 15.1 oz)  Adjusted ideal body weight: 84.5 kg (186 lb 5.9 oz) (Based on [employer] Tables)  Goal Weight: 185 pounds       Wt Readings from Last 10 Encounters:   03/15/19 112 kg (247 lb)   02/08/19 108.3 kg (238 lb 12.8 oz)   02/01/19 111.9 kg (246 lb 11.2 oz)   01/17/19 108.6 kg (239 lb 6.4 oz)   01/04/19 111.9 kg (246 lb 12.8 oz)   11/30/18 111.1 kg (244 lb 14.4 oz)   11/16/18 108.8 kg (239 lb 12.8 oz)   11/09/18 110 kg (242 lb 8 oz)   10/25/18 108.4 kg (238 lb 14.4 oz)   10/19/18 108.4 kg (239 lb)       Weight Metrics 3/15/2019 3/15/2019 2/8/2019 2/1/2019 1/17/2019 1/17/2019 1/4/2019   Weight - 247 lb 238 lb 12.8 oz 246 lb 11.2 oz - 239 lb 6.4 oz 246 lb 12.8 oz   Waist Measure Inches 39.5 - 36 38 37 - 37.5   Body Fat % - - 40.6 41.5 40.6 - 41.5   BMI - 36.48 kg/m2 35.26 kg/m2 36.43 kg/m2 - 35.35 kg/m2 36.45 kg/m2           History of binge eating: no    History of purging: no    Major lifestyle changes: no   Other commitments: no   Any potential unsupportive: no     Has Bakari Gonzalez ever been told by a physician not to exercise: no    Does Dianne Sy know of any reason they shouldn't exercise: no  If yes, why?  n/a    Does Dianne Sy have any food allergies or sensitivities: no      MWL questionnaire reviewed. If female:  No LMP recorded. Patient is not currently having periods (Reason: IUD). Past Medical History:   Diagnosis Date    GERD (gastroesophageal reflux disease)        No past surgical history on file. Current Outpatient Medications   Medication Sig Dispense Refill    OTHER       fish oil-dha-epa 1,200-144-216 mg cap Take  by mouth.  turmeric root extract 500 mg cap Take  by mouth.  Lactobacillus acidophilus (PROBIOTIC PO) Take  by mouth.  MULTIVITAMINS WITH FLUORIDE (MULTI-VITAMIN PO) Take  by mouth.          No Known Allergies    Social History     Tobacco Use    Smoking status: Never Smoker    Smokeless tobacco: Never Used   Substance Use Topics    Alcohol use: Yes     Comment: Occaisionally    Drug use: No       Family History   Problem Relation Age of Onset    Diabetes Mother     Hypertension Mother    [facility] Elevated Lipids Mother     Glaucoma Mother     Cataract Mother     Diabetes Father     Hypertension Father     Elevated Lipids Father        Family Status   Relation Name Status    Mother  (Not Specified)    Father  (Not Specified)       Review of Systems:   ROS    Positive in BOLD  CONST: Fever, weight loss, fatigue or chills  GI: Nausea, vomiting, abdominal pain, change in bowel habits, hematochezia, melena, and GERD   INTEG: Dermatitis, abnormal moles  HEENT: Recent changes in vision, vertigo, epistaxis, dysphagia and hoarseness  CV: Chest pain, palpitations, HTN, edema and varicosities  RESP: Cough, shortness of breath, wheezing, hemoptysis, snoring and reactive airway disease  : Hematuria, dysuria, frequency, urgency, nocturia and stress urinary incontinence   MS: Weakness, joint pain and arthritis  ENDO: Diabetes, thyroid disease, polyuria, polydipsia, polyphagia, poor wound healing, heat intolerance, cold intolerance  LYMPH/HEME: Anemia, bruising and history of blood transfusions  NEURO: Dizziness, headache, fainting, seizures and stroke  PSYCH: Anxiety and depression      Physical Exam    Visit Vitals  /73 (BP 1 Location: Left arm, BP Patient Position: At rest)   Pulse 92   Temp 96.3 °F (35.7 °C) (Oral)   Resp 20   Ht 5' 9\" (1.753 m)   Wt 112 kg (247 lb)   BMI 36.48 kg/m²           Physical Exam    General: 41 y.o.) female in no acute distress. HEENT: Normocephalic, atraumatic, Pupils equal and reactive, nasopharynx clear, oropharynx clear and moist without lesions  NECK: Supple, no lymphadenopathy, thyromegaly, carotid bruits or jugular venous distension. trachea midline  RESP: Clear to auscultation bilaterally, no wheezes, rhonchi, or rales, normal respiratory excursion  CV: Regular rate and rhythm, no murmurs, rubs or gallops. 3+/4 pulses in bilateral dorsalis pedis and posterior tibialis. No distal edema or varicosities. ABD: Soft, nontender, nondistended, normoactive bowel sounds, no hernias, no hepatosplenomegaly  Extremities: Warm, well perfused, no tenderness or swelling, normal gait/station  Neuro: Sensation and strength grossly intact and symmetrical  Psych: Alert and oriented to person, place, and time. Lab results reviewed. For significant abnormal values and values requiring intervention, see assessment and plan. Assessment/Plan  Stephanie Moore is a 41 y.o. female who is suffering from obesity with a BMI of 36.48  and comorbidities including no significant previous medical problems  who would benefit from weight loss.        Diet regimen   # of meal replacements prescribed: 3 replacements    If modified LCD-nutritional guidelines: one grocery store meal 500 calories or less     Monthly Goal   10-15 pounds     Medical monitoring schedule:   Weekly BP/Weight checks   Monthly provider appointments              Monthly CMP, uric acid checks      I have reviewed/discussed the above normal BMI with the patient. I have recommended the following interventions: dietary management education, guidance, and counseling, monitor weight and prescribed dietary intake .          Lindy Apodaca, BRYANNAP-BC

## 2019-04-04 ENCOUNTER — HOSPITAL ENCOUNTER (OUTPATIENT)
Dept: LAB | Age: 44
Discharge: HOME | End: 2019-04-04
Payer: COMMERCIAL

## 2019-04-04 DIAGNOSIS — E66.01 MORBID OBESITY (HCC): ICD-10-CM

## 2019-04-04 LAB — SENTARA SPECIMEN COL,SENBCF: NORMAL

## 2019-04-04 PROCEDURE — 99001 SPECIMEN HANDLING PT-LAB: CPT

## 2019-04-04 PROCEDURE — 93005 ELECTROCARDIOGRAM TRACING: CPT

## 2019-04-05 LAB
ABSOLUTE LYMPHOCYTE COUNT, 10803: 2.1 K/UL (ref 1–4.8)
ATRIAL RATE: 70 BPM
BASOPHILS # BLD: 0.1 K/UL (ref 0–0.2)
BASOPHILS NFR BLD: 1 % (ref 0–2)
BILIRUB UR QL: NEGATIVE
CALCULATED P AXIS, ECG09: 61 DEGREES
CALCULATED R AXIS, ECG10: 2 DEGREES
CALCULATED T AXIS, ECG11: 53 DEGREES
DIAGNOSIS, 93000: NORMAL
EOSINOPHIL # BLD: 0.1 K/UL (ref 0–0.5)
EOSINOPHIL NFR BLD: 2 % (ref 0–6)
ERYTHROCYTE [DISTWIDTH] IN BLOOD BY AUTOMATED COUNT: 13.7 % (ref 10–15.5)
GLUCOSE UR QL: NEGATIVE MG/DL
GRANULOCYTES,GRANS: 63 % (ref 40–75)
HCT VFR BLD AUTO: 42.4 % (ref 35.1–46.5)
HGB BLD-MCNC: 14.1 G/DL (ref 11.7–15.5)
HGB UR QL STRIP: NEGATIVE
KETONES UR QL STRIP.AUTO: NEGATIVE MG/DL
LEUKOCYTE ESTERASE: NEGATIVE
LYMPHOCYTES, LYMLT: 27 % (ref 20–45)
MCH RBC QN AUTO: 30 PG (ref 26–34)
MCHC RBC AUTO-ENTMCNC: 33 G/DL (ref 31–36)
MCV RBC AUTO: 90 FL (ref 80–95)
MONOCYTES # BLD: 0.6 K/UL (ref 0.1–1)
MONOCYTES NFR BLD: 8 % (ref 3–12)
NEUTROPHILS # BLD AUTO: 4.9 K/UL (ref 1.8–7.7)
NITRITE UR QL STRIP.AUTO: NEGATIVE
P-R INTERVAL, ECG05: 148 MS
PH UR STRIP: 7 PH (ref 5–8)
PLATELET # BLD AUTO: 359 K/UL (ref 140–440)
PMV BLD AUTO: 10.6 FL (ref 9–13)
PROT UR QL STRIP: NEGATIVE MG/DL
Q-T INTERVAL, ECG07: 410 MS
QRS DURATION, ECG06: 82 MS
QTC CALCULATION (BEZET), ECG08: 442 MS
RBC # BLD AUTO: 4.72 M/UL (ref 3.8–5.2)
RBC #/AREA URNS HPF: ABNORMAL /HPF
SP GR UR: 1.02 (ref 1–1.03)
TSH SERPL DL<=0.005 MIU/L-ACNC: 2.53 MCU/ML (ref 0.27–4.2)
UROBILINOGEN UR STRIP-MCNC: <2 MG/DL
VENTRICULAR RATE, ECG03: 70 BPM
WBC # BLD AUTO: 7.7 K/UL (ref 4–11)
WBC URNS QL MICRO: ABNORMAL /HPF (ref 0–2)

## 2019-04-25 ENCOUNTER — OFFICE VISIT (OUTPATIENT)
Dept: SURGERY | Age: 44
End: 2019-04-25

## 2019-04-25 VITALS
HEART RATE: 89 BPM | HEIGHT: 69 IN | WEIGHT: 243.1 LBS | TEMPERATURE: 98.5 F | BODY MASS INDEX: 36.01 KG/M2 | DIASTOLIC BLOOD PRESSURE: 67 MMHG | SYSTOLIC BLOOD PRESSURE: 110 MMHG | OXYGEN SATURATION: 97 %

## 2019-04-25 DIAGNOSIS — Z71.3 WEIGHT LOSS COUNSELING, ENCOUNTER FOR: ICD-10-CM

## 2019-04-25 DIAGNOSIS — Z86.39 H/O VITAMIN D DEFICIENCY: ICD-10-CM

## 2019-04-25 DIAGNOSIS — E66.9 OBESITY (BMI 30-39.9): ICD-10-CM

## 2019-04-25 DIAGNOSIS — E66.09 OBESITY DUE TO EXCESS CALORIES, UNSPECIFIED CLASSIFICATION, UNSPECIFIED WHETHER SERIOUS COMORBIDITY PRESENT: Primary | ICD-10-CM

## 2019-04-25 DIAGNOSIS — Z01.812 BLOOD TESTS PRIOR TO TREATMENT OR PROCEDURE: ICD-10-CM

## 2019-04-25 NOTE — PROGRESS NOTES
[facility] Progress Note:   F/up Provider Visit    CC: Weight Management    Luisa Bernal is a 41 y.o. female who is here for her  f/up medical provider visit for the LCD Program. she did not attend class last week. Week 5, -4 lbs since start. Today, the patient is  Height: 5' 9\" (175.3 cm) tall, Weight: 110.3 kg (243 lb 1.6 oz) lbs for a Body mass index is 35.9 kg/m². is suffering from obesity. Was suffering from ankle sprain, dec physical activity. Confusion about class attendance. Weight History  Weight Metrics 4/25/2019 4/25/2019 3/15/2019 3/15/2019 2/8/2019 2/1/2019 1/17/2019   Weight - 243 lb 1.6 oz - 247 lb 238 lb 12.8 oz 246 lb 11.2 oz -   Waist Measure Inches 37.5 - 39.5 - 36 38 37   Exercise Mins/week 125 - - - - - -   Body Fat % 43.9 - - - 40.6 41.5 40.6   BMI - 35.9 kg/m2 - 36.48 kg/m2 35.26 kg/m2 36.43 kg/m2 -       Starting wt:247  Goal wt: 185  EKG due: 50lb wt loss   Ideal body weight: 66.2 kg (145 lb 15.1 oz)  Adjusted ideal body weight: 83.8 kg (184 lb 12.9 oz)  Body mass index is 35.9 kg/m². History    Past Medical History:   Diagnosis Date    GERD (gastroesophageal reflux disease)        History reviewed. No pertinent surgical history. Current Outpatient Medications   Medication Sig Dispense Refill    OTHER       fish oil-dha-epa 1,200-144-216 mg cap Take  by mouth.  turmeric root extract 500 mg cap Take  by mouth.  Lactobacillus acidophilus (PROBIOTIC PO) Take  by mouth.  MULTIVITAMINS WITH FLUORIDE (MULTI-VITAMIN PO) Take  by mouth.          No Known Allergies    Social History     Tobacco Use    Smoking status: Never Smoker    Smokeless tobacco: Never Used   Substance Use Topics    Alcohol use: Yes     Comment: Occaisionally    Drug use: No       Family History   Problem Relation Age of Onset    Diabetes Mother     Hypertension Mother     Elevated Lipids Mother     Glaucoma Mother     Cataract Mother  Diabetes Father     Hypertension Father     Elevated Lipids Father        Family Status   Relation Name Status    Mother  (Not Specified)    Father  (Not Specified)         Medications:  Outpatient Medications Marked as Taking for the 4/25/19 encounter (Office Visit) with Ronald Savage NP   Medication Sig Dispense Refill    OTHER       fish oil-dha-epa 1,200-144-216 mg cap Take  by mouth.  turmeric root extract 500 mg cap Take  by mouth.  Lactobacillus acidophilus (PROBIOTIC PO) Take  by mouth.  MULTIVITAMINS WITH FLUORIDE (MULTI-VITAMIN PO) Take  by mouth. Review of Systems  Review of Systems   Musculoskeletal: Positive for joint pain. All other systems reviewed and are negative. Objective  Visit Vitals  /67 (BP Patient Position: Sitting)   Pulse 89   Temp 98.5 °F (36.9 °C) (Oral)   Ht 5' 9\" (1.753 m)   Wt 110.3 kg (243 lb 1.6 oz)   SpO2 97%   BMI 35.90 kg/m²     No LMP recorded. (Menstrual status: IUD). Physical Exam  Physical Exam   Constitutional: She is oriented to person, place, and time. She appears well-developed and well-nourished. HENT:   Head: Normocephalic. Cardiovascular: Normal rate. Pulmonary/Chest: Effort normal.   Musculoskeletal: Normal range of motion. Neurological: She is alert and oriented to person, place, and time. Skin: Skin is warm and dry. Psychiatric: She has a normal mood and affect. Nursing note and vitals reviewed. Assessment / Plan      1. Weight management    Degree of control: fair, continue LCD, reviewed class req    Progress was reviewed with patient. Goal(s) for next appointment:   -6lbs       2.   Labs    Latest results reviewed with patient, hemolyzed sample for some of the orders -- please have re drawn   Routine monitoring labs ordered  Orders Placed This Encounter    LIPID PANEL     Standing Status:   Future     Standing Expiration Date:   4/25/2020    MAGNESIUM     Standing Status: Future     Standing Expiration Date:   4/25/2020    URIC ACID     Standing Status:   Future     Standing Expiration Date:   8/66/4443    METABOLIC PANEL, COMPREHENSIVE     Standing Status:   Future     Standing Expiration Date:   4/25/2020    VITAMIN D, 25 HYDROXY     Standing Status:   Future     Standing Expiration Date:   4/25/2020       I have reviewed/discussed the above normal BMI with the patient. I have recommended the following interventions: dietary management education, guidance, and counseling, encourage exercise, monitor weight and prescribed dietary intake . .      Ms. Rojas has a reminder for a \"due or due soon\" health maintenance. I have asked that she contact her primary care provider for follow-up on this health maintenance.          >50% of 30 min visit spent counseling     MIKKI Lindsey-BC

## 2019-04-25 NOTE — PATIENT INSTRUCTIONS
If you have any questions or concerns about today's appointment, the verbal and/or written instructions you were given for follow up care, please call our office at [phone]. [facility] - [address]    [phone] office  [phone]

## 2019-04-25 NOTE — PROGRESS NOTES
Suzanne Ferrara is a 41 y.o. female (: 1975) presenting to address:    Chief Complaint   Patient presents with    Obesity     MWL       Medication list and allergies have been reviewed with Suzanne Ferrara and updated as of today's date. I have gone [facility] all Medical, Surgical and [facility] and updated/added the information accordingly.

## 2019-05-08 ENCOUNTER — HOSPITAL ENCOUNTER (OUTPATIENT)
Dept: LAB | Age: 44
Discharge: HOME | End: 2019-05-08

## 2019-05-08 LAB — SENTARA SPECIMEN COL,SENBCF: NORMAL

## 2019-05-08 PROCEDURE — 99001 SPECIMEN HANDLING PT-LAB: CPT

## 2019-05-09 LAB
25(OH)D3 SERPL-MCNC: 24.4 NG/ML (ref 32–100)
A-G RATIO,AGRAT: 1.6 RATIO (ref 1.1–2.6)
ALBUMIN SERPL-MCNC: 4.5 G/DL (ref 3.5–5)
ALP SERPL-CCNC: 69 U/L (ref 25–115)
ALT SERPL-CCNC: 16 U/L (ref 5–40)
ANION GAP SERPL CALC-SCNC: 12 MMOL/L
AST SERPL W P-5'-P-CCNC: 18 U/L (ref 10–37)
BILIRUB SERPL-MCNC: 0.2 MG/DL (ref 0.2–1.2)
BUN SERPL-MCNC: 20 MG/DL (ref 6–22)
CALCIUM SERPL-MCNC: 9.8 MG/DL (ref 8.4–10.5)
CHLORIDE SERPL-SCNC: 102 MMOL/L (ref 98–110)
CHOLEST SERPL-MCNC: 162 MG/DL (ref 110–200)
CO2 SERPL-SCNC: 24 MMOL/L (ref 20–32)
CREAT SERPL-MCNC: 0.7 MG/DL (ref 0.5–1.2)
GFRAA, 66117: >60
GFRNA, 66118: >60
GLOBULIN,GLOB: 2.9 G/DL (ref 2–4)
GLUCOSE SERPL-MCNC: 94 MG/DL (ref 70–99)
HDLC SERPL-MCNC: 2.4 MG/DL (ref 0–5)
HDLC SERPL-MCNC: 67 MG/DL (ref 40–59)
LDLC SERPL CALC-MCNC: 81 MG/DL (ref 50–99)
MAGNESIUM SERPL-MCNC: 1.8 MG/DL (ref 1.6–2.5)
POTASSIUM SERPL-SCNC: 4.1 MMOL/L (ref 3.5–5.5)
PROT SERPL-MCNC: 7.4 G/DL (ref 6.4–8.3)
SODIUM SERPL-SCNC: 138 MMOL/L (ref 133–145)
TRIGL SERPL-MCNC: 72 MG/DL (ref 40–149)
URATE SERPL-MCNC: 3.6 MG/DL (ref 2.2–7.7)
VLDLC SERPL CALC-MCNC: 14 MG/DL (ref 8–30)

## 2019-05-09 NOTE — PROGRESS NOTES
Please review with pt    Lipid panel, WNL   Mag, WNL   CMP, WNL   Uric acid, WNL    VITAMIN D, 24.4, please advise OTC replacement Vit D3 5,000 units daily     Future Appointments  Date Time Provider Nati Gtz  5/28/2019  9:00 AM Simran Clayton NP [facility] [address]

## 2019-05-10 ENCOUNTER — TELEPHONE (OUTPATIENT)
Dept: SURGERY | Age: 44
End: 2019-05-10

## 2019-05-10 NOTE — TELEPHONE ENCOUNTER
----- Message from Awilda Moyer NP sent at 5/9/2019  5:11 PM EDT -----  Please review with pt    Lipid panel, WNL   Mag, WNL   CMP, WNL   Uric acid, WNL    VITAMIN D, 24.4, please advise OTC replacement Vit D3 5,000 units daily     Future Appointments  Date Time Provider Nati Gtz  5/28/2019  9:00 AM Nan Clayton NP [facility] [address]

## 2019-06-25 ENCOUNTER — TELEPHONE (OUTPATIENT)
Dept: SURGERY | Age: 44
End: 2019-06-25

## 2019-06-25 NOTE — TELEPHONE ENCOUNTER
GONZÁLEZ ([phone] and sent text ([email or web address]) asking patient to call Dequan Trammell to be re-instate back in to the [facility] program

## 2019-07-03 ENCOUNTER — TELEPHONE (OUTPATIENT)
Dept: SURGERY | Age: 44
End: 2019-07-03

## 2019-07-03 NOTE — TELEPHONE ENCOUNTER
In response to patient call and email. Patsy Norris,    Thank you for your voicemail and email. I am happy to get you reenrolled into the Medical Weight Loss program at [place] [address]. I tried to call you but the voicemailbox was full. Please call me next week (I am on vacation for the rest of the week) and I can schedule you a reenroll appointment with the provider as well as give instructions for having your labs done prior to your appointment. Thank you,  Catherine Chapa,   [employer] [facility]   [phone]  office  [email or web address]  [email or web address]

## 2019-08-12 DIAGNOSIS — E66.9 OBESITY (BMI 30-39.9): Primary | ICD-10-CM

## 2019-08-22 ENCOUNTER — OFFICE VISIT (OUTPATIENT)
Dept: SURGERY | Age: 44
End: 2019-08-22

## 2019-08-22 VITALS
TEMPERATURE: 96.3 F | BODY MASS INDEX: 39.4 KG/M2 | RESPIRATION RATE: 20 BRPM | DIASTOLIC BLOOD PRESSURE: 74 MMHG | WEIGHT: 266 LBS | SYSTOLIC BLOOD PRESSURE: 121 MMHG | HEIGHT: 69 IN | HEART RATE: 97 BPM

## 2019-08-22 DIAGNOSIS — Z71.3 WEIGHT LOSS COUNSELING, ENCOUNTER FOR: ICD-10-CM

## 2019-08-22 DIAGNOSIS — E66.9 OBESITY (BMI 30-39.9): ICD-10-CM

## 2019-08-22 DIAGNOSIS — E66.9 OBESITY (BMI 30-39.9): Primary | ICD-10-CM

## 2019-08-22 DIAGNOSIS — Z86.39 H/O VITAMIN D DEFICIENCY: ICD-10-CM

## 2019-08-22 NOTE — PATIENT INSTRUCTIONS
Free Foods    Objective: Increase antioxidants to assist immune system in the healing process. Plan: Eat 3-5 servings of low carbohydrate vegetables listed below daily. You may season with any of the condiments and seasonings listed below. A serving size is a ½ cup steamed or 1 cup raw.     Vegetables  o Artichoke  or artichoke hearts  o Asparagus  o Beans (green, waxed, or [place])  o Bean Sprouts  o Beets  o Broccoli  o Brussel Sprouts  o Cabbage  o Cauliflower  o Celery  o Cucumbers  o Eggplant  o Green onions or scallions  o Greens (damon, kale, mustard, or turnip)  o Jicama  o Kohlrabi  o Leeks   o Mixed Vegetables (without corn, peas, or pasta)  o Mushrooms  o Okra  o Onions  o Pepper (all varieties)  o Radishes  o [employer] (Endive, Escarole, Lettuce, Jeancarlos, Spinach)  o Sauerkraut or borscht  o Salsa (without corn or beans)  o Hot Peppers  o Spinach      o Summer squash or chayote  o Tomato  o Tomato, canned  o Tomato, sauce  o Tomato/vegetable juice  o Turnips  o Water chestnuts  o Watercress (unlimited)  o Zucchini    Condiments  o Horseradish  o Lemon juice  o Lime juice  o Mustard  o Soy sauce  o Vinegar  o Flavoring extracts  o Garlic  o Herbs, fresh or dried  o [address] or hot pepper sauce      Exercise    Exercise daily   -Start slow, gradually your time by around 10 to 20 % a week  -To prevent injury, take a recovery week every 3 to 4 weeks (reduce your exercise      time/intensity by 1/2 )  -Your goal is to work up to 20 to 40 min every day; hard enough that you can't whistle or     sing      Diet    Count to 5  1 - Get plenty of water  2 - 2,000 mg Fish Oil, and 2 a day multivitamin  3 - At least 4-6 meals a day  4 - 4 palms of protein a day  5 - If its a carb and it's white, it's not right      Restrict carbohydrates  -Eat no more than 25 grams in a 4 hour period  -Limit bread, rice, cereal, pasta, fruit* or starchy    vegetables (potatoes, corn, beets, peas) These fruits are OK in small amounts: Berries, Kiwi, Peaches, Grapes (10 = 1 serving)    Resources:  Books       - The [address] for a [address] [employer]'s NEW Carb and Calorie Counter - 4th Edition (My Favorite)    Smart phone and Internet-based apps       - [employer] ([employer])nessPal        - Carb Manager          Eat Regular Meals      No more than 6 hours between each meal      Breakfast is especially important    Eat Only Good Fats  -Olive or Canola oil are OK in small amounts  -Limit Soybean oil  -Avoid Partially Hydrogenated oil    Eat Plenty of Fiber  Increase your fiber intake to at least 20 grams a day (use Fiber One products, Benefiber and Metamucil)    Eat Adequate Protein  - Goal is 30 grams a meal  - 4 oz of Meat or 6 oz Fish = 30 grams  - One Egg or cheese stick = 6 grams  - One slice of American cheese = 3 grams  -To add muscle, include a whey-based protein supplement right after workouts      Other Tips    - Don't assume any food is low in carbs; read the labels on packaged foods or a carbohydrate counter    - Get the support of family and friends    - Snack-proof your house    - Stock you kitchen with good stuff    - Commit to small obtainable goals    - Have strategies for social situations, meetings that run over or vacation    - If you fall off the plan it's not big deal; just start right back      Weight / Body Fat  Keep your waist to less than 1/2 of your height

## 2019-08-22 NOTE — PROGRESS NOTES
[facility] Progress Note:   F/up Provider Visit     CC: Weight Management      Arron Cleaning is a 41 y.o. female who is here for her  f/up medical provider visit for the LCD Program. Last seen by Kermitt Hashimoto, NP 4/25/2019. Pt has been told once a month classes and once monthly. Still some confusion with attendance needs. Pt desirous of reentering \"stricter program,\" would be weekly classes with staff and monthly provider meetings. Will need \"re-enter\" workup last labs >3 mos ago. Gained about 20 lbs over summer non compliant by own admission. Explained 16 week reassessment process. Weight History    Ideal body weight: 66.2 kg (145 lb 15.1 oz)  Adjusted ideal body weight: 88 kg (193 lb 15.5 oz) (Based on [employer] Tables)      Wt Readings from Last 10 Encounters:   08/22/19 120.7 kg (266 lb)   04/25/19 110.3 kg (243 lb 1.6 oz)   03/15/19 112 kg (247 lb)   02/08/19 108.3 kg (238 lb 12.8 oz)   02/01/19 111.9 kg (246 lb 11.2 oz)   01/17/19 108.6 kg (239 lb 6.4 oz)   01/04/19 111.9 kg (246 lb 12.8 oz)   11/30/18 111.1 kg (244 lb 14.4 oz)   11/16/18 108.8 kg (239 lb 12.8 oz)   11/09/18 110 kg (242 lb 8 oz)       Weight Metrics 8/22/2019 8/22/2019 4/25/2019 4/25/2019 3/15/2019 3/15/2019 2/8/2019   Weight - 266 lb - 243 lb 1.6 oz - 247 lb 238 lb 12.8 oz   Waist Measure Inches 40 - 37.5 - 39.5 - 36   Exercise Mins/week - - 125 - - - -   Body Fat % - - 43.9 - - - 40.6   BMI - 39.28 kg/m2 - 35.9 kg/m2 - 36.48 kg/m2 35.26 kg/m2         Starting wt:247  Goal wt: 185  EKG due: 50lb wt loss   Ideal body weight: 66.2 kg (145 lb 15.1 oz)  Adjusted ideal body weight: 83.8 kg (184 lb 12.9 oz)  Body mass index is 35.9 kg/m².       History    Past Medical History:   Diagnosis Date    GERD (gastroesophageal reflux disease)        No past surgical history on file.     Current Outpatient Medications   Medication Sig Dispense Refill    OTHER       fish oil-dha-epa 1,200-144-216 mg cap Take  by mouth.  turmeric root extract 500 mg cap Take  by mouth.  Lactobacillus acidophilus (PROBIOTIC PO) Take  by mouth.  MULTIVITAMINS WITH FLUORIDE (MULTI-VITAMIN PO) Take  by mouth. No Known Allergies    Social History     Tobacco Use    Smoking status: Never Smoker    Smokeless tobacco: Never Used   Substance Use Topics    Alcohol use: Yes     Comment: Occaisionally    Drug use: No       Family History   Problem Relation Age of Onset    Diabetes Mother     Hypertension Mother    Erle Vinicio Elevated Lipids Mother     Glaucoma Mother     Cataract Mother     Diabetes Father     Hypertension Father     Elevated Lipids Father        Family Status   Relation Name Status    Mother  (Not Specified)    Father  (Not Specified)           Review of Systems  Positives in [place]    Constitutional:  Unexpected weight gain/loss, night sweats,fatigue/maliase/lethargy, change in sleep, fever, rash  Eyes:  Visual changes, headaches, eye pain, floaters  ENT:  Rhinorrhea, epistaxis, sinus pain, change in hearing, gingival bleeding, sore throat, dysphagia, dysphonia  CV:  Chest pain, shortness of breath, difficulty breathing, orthopnea, palpitations, loss of consciousness, claudication  Resp: Cough, wheeze, haemoptysis, sob, exercise intolerence recent uri  GI:  Abdominal pain, dysphagia, reflux, bloating, cramping. Obstipation, haematemesis, brbpr, hematochezia,dark tarry stools. Nausea, vomitting, diarrhea, constipation.     : Change in frequency of urination, dysuria, hematuria, change in force of Stream  Neuro: Paresthesias, numbness, weakness, changes in balance, changes in speech, loss of control of bowel or bladder,   Psych:Changes in depression, anxiety, sleep patterns, change in energy Levels  Endocrine: Temperature intolerance, dry skin, hair loss, fatigue,  Episodes of hypoglycemia, changes in libido  Heme: Anemia, pupura, petechia  Skin: Rashes, itchiness, excessive bruising    Remainder of ROS as per HPI. Since your last visit, have you experienced any complications? no  If yes, please list:     Are you taking an appetite suppressant? no  If so:  Do you need a refill? no  Are you experiencine any Chest Pain, Palpitations or Dizziness? no    BP Readings from Last 3 Encounters:   08/22/19 121/74   04/25/19 110/67   03/15/19 113/73           Have you received any other medical care this week? yes  If yes, where and for what? For URI. Have you discontinued or changed any medicine or dose of your medicine(s) since your last visit with [facility] Weight Loss provider? yes    If yes, where and for what? Dose pack and tesslon perles. Diet  How many ounces of calorie-free liquids did you consume each day? >64-80 oz    How many meal replacements did you take each day? 0    Did you have any problems adhering to the program?  yes   If yes, please explain: \"tood summer off to eat whatever I want. \"       Exercise  Not currently exercising. Objective  Visit Vitals  /74 (BP 1 Location: Left arm, BP Patient Position: At rest)   Pulse 97   Temp 96.3 °F (35.7 °C) (Oral)   Resp 20   Ht 5' 9\" (1.753 m)   Wt 120.7 kg (266 lb)   BMI 39.28 kg/m²     No LMP recorded. (Menstrual status: IUD). Physical Exam      General: AAOX3, pleasant and cooperative to exam. Appropriately groomed. NAD. Non-toxic in appearance. Appears stated age. HENT: NC/AT. PERRLA. Extraocular motions are intact. Sclera anicteric, Conjunctiva Clear. Nares clear. Oropharynx pink, moist without exudate or erythema. Uvula Midline. Neck:  Trachea is midline. No visible JVD, Lymphadenopathy. Chest: Good equal bilateral expansion  Lungs: Clear to auscultation bilaterally without e/o crackles, wheezes or rhales. Heart: RRR, S1 and S2 noted. No c/r/m/g/vpmi. Abdomen: obese, soft and non-tender without distension. Good bowel sounds. No vis/palp masses or pulsations. No organo-splenomegaly.  No hernias to my exam. No e/o acute abdomen or peritoneal signs. :  Deferred  Rectal: Deferred  Extremities:  No C/C/E  Neuro: CN II-XII appear to be grossly intact without focal deficit. Ambulatory. Skin: Clean, warm and dry. Assessment / Plan    Encounter Diagnoses   Name Primary?  Obesity (BMI 30-39. 9) Yes    H/O vitamin D deficiency     Weight loss counseling, encounter for        1. Weight management    Degree of control: Poor. Progress was reviewed with patient after many weeks in the program. However, restarting today. Will reassess at 16 weeks. Goal(s) for next appointment: 1. Exercise. 2. Get into ketosis       3. Stay in ketosis       4. Smart Mistake       5. Only eat  when hungry. Number of meal replacements per day: 2 MR + 2 meals (once bloodwork and ekg reviewed). Until then 4-6,  4-6oz Pro meals with green leafy/Day. 2.  Labs    Latest results reviewed with patient; Vit D deficiency. Routine monitoring labs ordered  Orders Placed This Encounter    LIPID PANEL    MAGNESIUM    URIC ACID    VITAMIN D, 25 HYDROXY    METABOLIC PANEL, COMPREHENSIVE    CBC WITH AUTOMATED DIFF    TSH 3RD GENERATION    URINALYSIS W/MICROSCOPIC    EKG, 12 LEAD, INITIAL       3. Goals   4-8 lbs weight loss    I have reviewed/discussed the above normal BMI with the patient. I have recommended the following interventions: dietary management education, guidance, and counseling, encourage exercise, monitor weight and prescribed dietary intake .             >50% of 45 min visit spent counseling     Jelena Meyers MS, PA-C

## 2019-08-23 ENCOUNTER — HOSPITAL ENCOUNTER (OUTPATIENT)
Dept: LAB | Age: 44
Discharge: HOME | End: 2019-08-23

## 2019-08-23 ENCOUNTER — HOSPITAL ENCOUNTER (OUTPATIENT)
Dept: LAB | Age: 44
Discharge: HOME | End: 2019-08-23
Payer: COMMERCIAL

## 2019-08-23 DIAGNOSIS — Z86.39 H/O VITAMIN D DEFICIENCY: ICD-10-CM

## 2019-08-23 DIAGNOSIS — E66.9 OBESITY (BMI 30-39.9): ICD-10-CM

## 2019-08-23 DIAGNOSIS — Z71.3 WEIGHT LOSS COUNSELING, ENCOUNTER FOR: ICD-10-CM

## 2019-08-23 LAB — SENTARA SPECIMEN COL,SENBCF: NORMAL

## 2019-08-23 PROCEDURE — 99001 SPECIMEN HANDLING PT-LAB: CPT

## 2019-08-23 PROCEDURE — 93005 ELECTROCARDIOGRAM TRACING: CPT

## 2019-08-24 LAB
25(OH)D3 SERPL-MCNC: 26.3 NG/ML (ref 32–100)
A-G RATIO,AGRAT: 1.4 RATIO (ref 1.1–2.6)
ABSOLUTE LYMPHOCYTE COUNT, 10803: 2.6 K/UL (ref 1–4.8)
ALBUMIN SERPL-MCNC: 4.2 G/DL (ref 3.5–5)
ALP SERPL-CCNC: 67 U/L (ref 25–115)
ALT SERPL-CCNC: 17 U/L (ref 5–40)
ANION GAP SERPL CALC-SCNC: 17 MMOL/L
AST SERPL W P-5'-P-CCNC: 19 U/L (ref 10–37)
ATRIAL RATE: 78 BPM
BASOPHILS # BLD: 0 K/UL (ref 0–0.2)
BASOPHILS NFR BLD: 0 % (ref 0–2)
BILIRUB SERPL-MCNC: 0.2 MG/DL (ref 0.2–1.2)
BILIRUB UR QL: NEGATIVE
BUN SERPL-MCNC: 13 MG/DL (ref 6–22)
CA OXALATE CRYSTALS,CAOXC: PRESENT
CALCIUM SERPL-MCNC: 9 MG/DL (ref 8.4–10.5)
CALCULATED P AXIS, ECG09: 73 DEGREES
CALCULATED R AXIS, ECG10: -3 DEGREES
CALCULATED T AXIS, ECG11: 42 DEGREES
CHLORIDE SERPL-SCNC: 103 MMOL/L (ref 98–110)
CHOLEST SERPL-MCNC: 170 MG/DL (ref 110–200)
CO2 SERPL-SCNC: 22 MMOL/L (ref 20–32)
CREAT SERPL-MCNC: 0.8 MG/DL (ref 0.5–1.2)
DIAGNOSIS, 93000: NORMAL
EOSINOPHIL # BLD: 0.1 K/UL (ref 0–0.5)
EOSINOPHIL NFR BLD: 1 % (ref 0–6)
EPITHELIAL,EPSU: ABNORMAL /HPF (ref 0–2)
ERYTHROCYTE [DISTWIDTH] IN BLOOD BY AUTOMATED COUNT: 13.9 % (ref 10–15.5)
GFRAA, 66117: >60
GFRNA, 66118: >60
GLOBULIN,GLOB: 3 G/DL (ref 2–4)
GLUCOSE SERPL-MCNC: 92 MG/DL (ref 70–99)
GLUCOSE UR QL: NEGATIVE MG/DL
GRANULOCYTES,GRANS: 69 % (ref 40–75)
HCT VFR BLD AUTO: 39.2 % (ref 35.1–46.5)
HDLC SERPL-MCNC: 2.7 MG/DL (ref 0–5)
HDLC SERPL-MCNC: 63 MG/DL (ref 40–59)
HGB BLD-MCNC: 12.9 G/DL (ref 11.7–15.5)
HGB UR QL STRIP: NEGATIVE
KETONES UR QL STRIP.AUTO: NEGATIVE MG/DL
LDLC SERPL CALC-MCNC: 87 MG/DL (ref 50–99)
LEUKOCYTE ESTERASE: NEGATIVE
LYMPHOCYTES, LYMLT: 21 % (ref 20–45)
MAGNESIUM SERPL-MCNC: 1.9 MG/DL (ref 1.6–2.5)
MCH RBC QN AUTO: 30 PG (ref 26–34)
MCHC RBC AUTO-ENTMCNC: 33 G/DL (ref 31–36)
MCV RBC AUTO: 90 FL (ref 80–95)
MONOCYTES # BLD: 1.1 K/UL (ref 0.1–1)
MONOCYTES NFR BLD: 9 % (ref 3–12)
NEUTROPHILS # BLD AUTO: 8.6 K/UL (ref 1.8–7.7)
NITRITE UR QL STRIP.AUTO: NEGATIVE
P-R INTERVAL, ECG05: 142 MS
PH UR STRIP: 6 PH (ref 5–8)
PLATELET # BLD AUTO: 357 K/UL (ref 140–440)
PMV BLD AUTO: 10.1 FL (ref 9–13)
POTASSIUM SERPL-SCNC: 3.8 MMOL/L (ref 3.5–5.5)
PROT SERPL-MCNC: 7.2 G/DL (ref 6.4–8.3)
PROT UR QL STRIP: NEGATIVE MG/DL
Q-T INTERVAL, ECG07: 380 MS
QRS DURATION, ECG06: 82 MS
QTC CALCULATION (BEZET), ECG08: 433 MS
RBC # BLD AUTO: 4.35 M/UL (ref 3.8–5.2)
RBC #/AREA URNS HPF: ABNORMAL /HPF
SODIUM SERPL-SCNC: 142 MMOL/L (ref 133–145)
SP GR UR: 1.01 (ref 1–1.03)
TRIGL SERPL-MCNC: 104 MG/DL (ref 40–149)
TSH SERPL DL<=0.005 MIU/L-ACNC: 1.94 MCU/ML (ref 0.27–4.2)
URATE SERPL-MCNC: 4.7 MG/DL (ref 2.2–7.7)
UROBILINOGEN UR STRIP-MCNC: 2 MG/DL
VENTRICULAR RATE, ECG03: 78 BPM
VLDLC SERPL CALC-MCNC: 21 MG/DL (ref 8–30)
WBC # BLD AUTO: 12.5 K/UL (ref 4–11)
WBC URNS QL MICRO: ABNORMAL /HPF (ref 0–2)

## 2019-08-26 ENCOUNTER — DOCUMENTATION ONLY (OUTPATIENT)
Dept: SURGERY | Age: 44
End: 2019-08-26

## 2019-08-26 NOTE — PROGRESS NOTES
Pt labs results and ekg reviewed. Pt will start Vit D and is ok to begin 4 MR's daily in lieu of food as discussed in office at time of initial visit. Pt informed.     Lluvia Ballard MS, PAJeanieC

## 2019-08-29 ENCOUNTER — DOCUMENTATION ONLY (OUTPATIENT)
Dept: SURGERY | Age: 44
End: 2019-08-29

## 2019-09-11 ENCOUNTER — CLINICAL SUPPORT (OUTPATIENT)
Dept: SURGERY | Age: 44
End: 2019-09-11

## 2019-09-11 DIAGNOSIS — E66.01 CLASS 2 SEVERE OBESITY DUE TO EXCESS CALORIES WITH SERIOUS COMORBIDITY AND BODY MASS INDEX (BMI) OF 38.0 TO 38.9 IN ADULT (HCC): Primary | ICD-10-CM

## 2019-09-13 VITALS
WEIGHT: 258.9 LBS | BODY MASS INDEX: 38.34 KG/M2 | HEART RATE: 91 BPM | HEIGHT: 69 IN | SYSTOLIC BLOOD PRESSURE: 120 MMHG | DIASTOLIC BLOOD PRESSURE: 82 MMHG

## 2019-09-13 NOTE — PROGRESS NOTES
Patient attended a weekly class as part of the Medically Supervised Weight Loss Program.  This class was facilitated by a Registered Dietitian. Topics taught at class focused on diet, particularly carbohydrate counting and portion control. Classes also focused on behavior changes and the importance of establishing a daily exercise routine. Progress Note: Weekly Medical Monitoring in the [facility] Program    Is there anything that you or the patient needs to let the supervising provider know about? no    Over the past week, have you experienced any side-effects? no    Carito Kumar is a 45 y.o. female who is enrolled in [facility]    Carito Kumar was prescribed the VLCD / LCD. Visit Vitals  /82 (BP 1 Location: Right arm, BP Patient Position: Sitting)   Pulse 91   Ht 5' 9\" (1.753 m)   Wt 117.4 kg (258 lb 14.4 oz)   BMI 38.23 kg/m²     Weight Metrics 9/13/2019 9/11/2019 8/22/2019 8/22/2019 4/25/2019 4/25/2019 3/15/2019   Weight - 258 lb 14.4 oz - 266 lb - 243 lb 1.6 oz -   Waist Measure Inches 40.5 - 40 - 37.5 - 39.5   Exercise Mins/week 0 - - - 125 - -   Body Fat % - - - - 43.9 - -   BMI - 38.23 kg/m2 - 39.28 kg/m2 - 35.9 kg/m2 -         Have you received any other medical care this week? no  If yes, where and for what? Have you had any change in your medications since your last visit? no  If yes what? Did you have any problems adhering to the program last week? no  If yes, please explain:       Eating Habits Over Last Week:  Did you take in 64 oz of non-caloric fluids? yes     Did you consume your prescribed meal replacement regimen each day?  yes       Physical Activity Over the Past Week:    Aerobic exercise: 0 min  Resistance exercise: 0 workouts / week

## 2019-09-25 ENCOUNTER — CLINICAL SUPPORT (OUTPATIENT)
Dept: SURGERY | Age: 44
End: 2019-09-25

## 2019-09-25 DIAGNOSIS — E66.01 CLASS 2 SEVERE OBESITY DUE TO EXCESS CALORIES WITH SERIOUS COMORBIDITY AND BODY MASS INDEX (BMI) OF 38.0 TO 38.9 IN ADULT (HCC): Primary | ICD-10-CM

## 2019-09-25 RX ORDER — MELATONIN
DAILY
COMMUNITY

## 2019-09-26 VITALS
DIASTOLIC BLOOD PRESSURE: 78 MMHG | HEART RATE: 75 BPM | BODY MASS INDEX: 38.36 KG/M2 | WEIGHT: 259 LBS | HEIGHT: 69 IN | SYSTOLIC BLOOD PRESSURE: 124 MMHG | TEMPERATURE: 96.9 F | OXYGEN SATURATION: 99 %

## 2019-09-26 NOTE — PROGRESS NOTES
Sourav Nascimento is a 45 y.o. female (: 1975) presenting to address:    Chief Complaint   Patient presents with    Weight Management     MWL;weekly       Medication list and allergies have been reviewed with Sourav Nascimento and updated as of today's date. I have gone over all Medical, Surgical and Social History with Alfonso Rojas and updated/added the information accordingly. 1. Have you been to the ER, urgent care clinic since your last visit? Hospitalized since your last visit? No    2. Have you seen or consulted any other health care providers outside of the [address] [place] [address] since your last visit? Include any pap smears or colon screening. No     Patient attended a weekly class as part of the Medically Supervised Weight Loss Program.  This class was facilitated by a Registered Dietitian. Topics taught at class focused on diet, particularly carbohydrate counting and portion control. Classes also focused on behavior changes and the importance of establishing a daily exercise routine. Progress Note: Weekly Medical Monitoring in the [facility] Loss Program    Is there anything that you or the patient needs to let the supervising provider know about? no    Over the past week, have you experienced any side-effects? no    Sourav Nascimento is a 45 y.o. female who is enrolled in [facility]    Sourav Nascimento was prescribed the LCD.     Visit Vitals  /78 (BP 1 Location: Right arm, BP Patient Position: Sitting)   Pulse 75   Temp 96.9 °F (36.1 °C) (Oral)   Ht 5' 9\" (1.753 m)   Wt 117.5 kg (259 lb)   SpO2 99%   BMI 38.25 kg/m²     Weight Metrics 2019   Weight 259 lb - 258 lb 14.4 oz - 266 lb - 243 lb 1.6 oz   Waist Measure Inches - 40.5 - 40 - 37.5 -   Exercise Mins/week - 0 - - - 125 -   Body Fat % - - - - - 43.9 - BMI 38.25 kg/m2 - 38.23 kg/m2 - 39.28 kg/m2 - 35.9 kg/m2         Have you received any other medical care this week? no  If yes, where and for what? Have you had any change in your medications since your last visit? no  If yes what? Did you have any problems adhering to the program last week? no  If yes, please explain:       Eating Habits Over Last Week:  Did you take in 64 oz of non-caloric fluids? yes     Did you consume your prescribed meal replacement regimen each day? no       Physical Activity Over the Past Week:    Aerobic exercise: 120 min  Resistance exercise: 3 workouts / week    Patient did three days of VLCD and 4 days of LCD, however does not record her portion size. She verbalized to me that she struggles with meal times in the home as she watches her family eat or has to prepare meals for them. She also equates \"feeling\" with food and recognized that food selection and eating habits can be exacerbated by emotional triggers. I urged patient to discuss these concerns with RD and provider for advice and recommendations.

## 2019-10-10 ENCOUNTER — HOSPITAL ENCOUNTER (OUTPATIENT)
Dept: LAB | Age: 44
Discharge: HOME | End: 2019-10-10

## 2019-10-10 ENCOUNTER — OFFICE VISIT (OUTPATIENT)
Dept: SURGERY | Age: 44
End: 2019-10-10

## 2019-10-10 VITALS
TEMPERATURE: 96.1 F | HEIGHT: 69 IN | OXYGEN SATURATION: 93 % | DIASTOLIC BLOOD PRESSURE: 64 MMHG | SYSTOLIC BLOOD PRESSURE: 107 MMHG | HEART RATE: 93 BPM | BODY MASS INDEX: 37.96 KG/M2 | WEIGHT: 256.3 LBS

## 2019-10-10 DIAGNOSIS — E66.9 OBESITY (BMI 30-39.9): ICD-10-CM

## 2019-10-10 DIAGNOSIS — Z71.3 WEIGHT LOSS COUNSELING, ENCOUNTER FOR: ICD-10-CM

## 2019-10-10 DIAGNOSIS — Z86.39 H/O VITAMIN D DEFICIENCY: ICD-10-CM

## 2019-10-10 DIAGNOSIS — E66.9 OBESITY (BMI 30-39.9): Primary | ICD-10-CM

## 2019-10-10 LAB — SENTARA SPECIMEN COL,SENBCF: NORMAL

## 2019-10-10 PROCEDURE — 99001 SPECIMEN HANDLING PT-LAB: CPT

## 2019-10-10 RX ORDER — PHENTERMINE HYDROCHLORIDE 37.5 MG/1
37.5 TABLET ORAL
Qty: 30 TAB | Refills: 0 | Status: SHIPPED | OUTPATIENT
Start: 2019-10-10 | End: 2020-05-11 | Stop reason: DRUGHIGH

## 2019-10-10 NOTE — PATIENT INSTRUCTIONS
If you have any questions or concerns about today's appointment, the verbal and/or written instructions you were given for follow up care, please call our office at [phone]. [facility] - [address]    [phone] office  [phone]     Free Foods    Objective: Increase antioxidants to assist immune system in the healing process. Plan: Eat 3-5 servings of low carbohydrate vegetables listed below daily. You may season with any of the condiments and seasonings listed below. A serving size is a ½ cup steamed or 1 cup raw.     Vegetables  o Artichoke  or artichoke hearts  o Asparagus  o Beans (green, waxed, or [place])  o Bean Sprouts  o Beets  o Broccoli  o Brussel Sprouts  o Cabbage  o Cauliflower  o Celery  o Cucumbers  o Eggplant  o Green onions or scallions  o Greens (damon, kale, mustard, or turnip)  o Jicama  o Kohlrabi  o Leeks   o Mixed Vegetables (without corn, peas, or pasta)  o Mushrooms  o Okra  o Onions  o Pepper (all varieties)  o Radishes  o [employer] (Endive, Escarole, Lettuce, Jeancarlos, Spinach)  o Sauerkraut or borscht  o Salsa (without corn or beans)  o Hot Peppers  o Spinach      o Summer squash or chayote  o Tomato  o Tomato, canned  o Tomato, sauce  o Tomato/vegetable juice  o Turnips  o Water chestnuts  o Watercress (unlimited)  o Zucchini    Condiments  o Horseradish  o Lemon juice  o Lime juice  o Mustard  o Soy sauce  o Vinegar  o Flavoring extracts  o Garlic  o Herbs, fresh or dried  o [address] or hot pepper sauce

## 2019-10-10 NOTE — PROGRESS NOTES
[facility] Progress Note:   F/up Provider Visit     CC: Weight Management      Martha Caballero is a 45 y.o. female who is here for her  f/up medical provider visit for the LCD Program.    Weight History    Ideal body weight: 66.2 kg (145 lb 15.1 oz)  Adjusted ideal body weight: 86.2 kg (190 lb 1.4 oz) (Based on [employer] Tables)  Overall weight loss goal: 185 lb    Wt Readings from Last 10 Encounters:   10/10/19 116.3 kg (256 lb 4.8 oz)   09/26/19 117.5 kg (259 lb)   09/13/19 117.4 kg (258 lb 14.4 oz)   08/22/19 120.7 kg (266 lb)   04/25/19 110.3 kg (243 lb 1.6 oz)   03/15/19 112 kg (247 lb)   02/08/19 108.3 kg (238 lb 12.8 oz)   02/01/19 111.9 kg (246 lb 11.2 oz)   01/17/19 108.6 kg (239 lb 6.4 oz)   01/04/19 111.9 kg (246 lb 12.8 oz)       Weight Metrics 10/10/2019 10/10/2019 9/25/2019 9/13/2019 9/11/2019 8/22/2019 8/22/2019   Weight - 256 lb 4.8 oz 259 lb - 258 lb 14.4 oz - 266 lb   Waist Measure Inches 42 - - 40.5 - 40 -   Exercise Mins/week - - - 0 - - -   Body Fat % - - - - - - -   BMI - 37.85 kg/m2 38.25 kg/m2 - 38.23 kg/m2 - 39.28 kg/m2             History    Past Medical History:   Diagnosis Date    GERD (gastroesophageal reflux disease)        History reviewed. No pertinent surgical history. Current Outpatient Medications   Medication Sig Dispense Refill    cholecalciferol (VITAMIN D3) (1000 Units /25 mcg) tablet Take  by mouth daily.  fish oil-dha-epa 1,200-144-216 mg cap Take  by mouth.  Lactobacillus acidophilus (PROBIOTIC PO) Take  by mouth.  MULTIVITAMINS WITH FLUORIDE (MULTI-VITAMIN PO) Take  by mouth.  OTHER       turmeric root extract 500 mg cap Take  by mouth.          No Known Allergies    Social History     Tobacco Use    Smoking status: Never Smoker    Smokeless tobacco: Never Used   Substance Use Topics    Alcohol use: Yes     Comment: Occaisionally    Drug use: No       Family History   Problem Relation Age of Onset    Diabetes Mother     Hypertension Mother    [de-identified] Elevated Lipids Mother     Glaucoma Mother     Cataract Mother     Diabetes Father     Hypertension Father    [de-identified] Elevated Lipids Father        Family Status   Relation Name Status    Mother  (Not Specified)    Father  (Not Specified)           Review of Systems  Positives in [place]    Constitutional:  Unexpected weight gain/loss, night sweats,fatigue/maliase/lethargy, change in sleep, fever, rash  Eyes:  Visual changes, headaches, eye pain, floaters  ENT:  Rhinorrhea, epistaxis, sinus pain, change in hearing, gingival bleeding, sore throat, dysphagia, dysphonia  CV:  Chest pain, shortness of breath, difficulty breathing, orthopnea, palpitations, loss of consciousness, claudication  Resp: Cough, wheeze, haemoptysis, sob, exercise intolerence  GI:  Abdominal pain, dysphagia, reflux, bloating, cramping. Obstipation, haematemesis, brbpr, hematochezia,dark tarry stools. Nausea, vomitting, diarrhea, constipation. : Change in frequency of urination, dysuria, hematuria, change in force of Stream  Neuro: Paresthesias, numbness, weakness, changes in balance, changes in speech, loss of control of bowel or bladder,   Psych:Changes in depression, anxiety, sleep patterns, change in energy Levels  Endocrine: Temperature intolerance, dry skin, hair loss, fatigue,  Episodes of hypoglycemia, changes in libido  Heme: Anemia, pupura, petechia  Skin: Rashes, itchiness, excessive bruising    Remainder of ROS as per HPI. Since your last visit, have you experienced any complications? no  If yes, please list:     Are you taking an appetite suppressant? No But would like to start  If so:  Do you need a refill? no  Are you experiencine any Chest Pain, Palpitations or Dizziness? no    BP Readings from Last 3 Encounters:   10/10/19 107/64   09/25/19 124/78   09/13/19 120/82           Have you received any other medical care this week? no  If yes, where and for what? Have you discontinued or changed any medicine or dose of your medicine(s) since your last visit with [facility] provider? no    If yes, where and for what? Diet  How many ounces of calorie-free liquids did you consume each day? Gallon of water per day    How many meal replacements did you take each day? 3 MR michael prepped    Did you have any problems adhering to the program?  yes  If yes, please explain: snacking around 3pm with [employer] due excessive hunger after excercising      Exercise  Aerobic exercise: 45 min/3 days per week  Resistance exercise: 30 min workouts / 2 days per week  Any discomfort?  no     If yes, where? Objective  Visit Vitals  /64 (BP 1 Location: Right arm, BP Patient Position: Sitting)   Pulse 93   Temp 96.1 °F (35.6 °C) (Oral)   Ht 5' 9\" (1.753 m)   Wt 116.3 kg (256 lb 4.8 oz)   SpO2 93%   BMI 37.85 kg/m²     No LMP recorded. (Menstrual status: IUD). Physical Exam      General: AAOX3, pleasant and cooperative to exam. Appropriately groomed. NAD. Non-toxic in appearance. Appears stated age. HENT: NC/AT. PERRLA. Extraocular motions are intact. Sclera anicteric, Conjunctiva Clear. Nares clear. Oropharynx pink, moist without exudate or erythema. Uvula Midline. Neck:  Trachea is midline. No visible JVD, Lymphadenopathy. Chest: Good equal bilateral expansion  Lungs: Clear to auscultation bilaterally without e/o crackles, wheezes or rhales. Heart: RRR, S1 and S2 noted. No c/r/m/g/vpmi. Abdomen: obese, soft and non-tender without distension. Good bowel sounds. No vis/palp masses or pulsations. No organo-splenomegaly. No hernias to my exam. No e/o acute abdomen or peritoneal signs. :  Deferred  Rectal: Deferred  Extremities:  No C/C/E  Neuro: CN II-XII appear to be grossly intact without focal deficit. Ambulatory. Skin: Clean, warm and dry. Assessment / Plan    Encounter Diagnoses   Name Primary?  Obesity (BMI 30-39. 9) Yes    H/O vitamin D deficiency     Weight loss counseling, encounter for        1. Weight management    Degree of control: On track, more disciplined    Progress was reviewed with patient after 12  weeks in the program.    Goal(s) for next appointment:10 lb weight loss, Continue exercise regimen, Attend medical weight loss monthly class, labs prior to provider visit   Number of meal replacements per day: 3MR, 1 prepped meal     2. Labs    Latest results reviewed with patient   Routine monitoring labs ordered  Orders Placed This Encounter    URIC ACID    CBC WITH AUTOMATED DIFF    METABOLIC PANEL, COMPREHENSIVE    URIC ACID    METABOLIC PANEL, COMPREHENSIVE       3. Goals-As stated above  Will start phentermine 37.5 mg daily to assist with weight loss and suppressing excessive hunger     I have reviewed/discussed the above normal BMI with the patient. I have recommended the following interventions: dietary management education, guidance, and counseling, encourage exercise, monitor weight and prescribed dietary intake .         >50% of 25 min visit spent counseling     MIKKI Guerrier-BC

## 2019-10-10 NOTE — PROGRESS NOTES
Nursing Note for Medical Monitoring in the [facility]      Berlin Chiu is a 45 y.o. female who is enrolled in [facility]    Berlin Chiu was prescribed the VLCD / LCD. Did you have any problems adhering to the program last week? yes  If yes, please explain: No longer taking steroids for laryngitis    Since your last visit, have you experienced any complications? no    Have you received any other medical care this week? no  If yes, where and for what? Have you had any change in your medications since your last visit? no  If yes what? Are you taking an appetite suppressant? no   If yes:  Do you need a refill? BP Readings from Last 3 Encounters:   09/25/19 124/78   09/13/19 120/82   08/22/19 121/74        Eating Habits Over Last Week:  Did you take in 64 oz of non-caloric fluids?  yes     Did you consume your prescribed meal replacement regimen each day? no       Physical Activity Over the Past Week:    Aerobic exercise: 135 min  Resistance exercise: 0workouts / week

## 2019-10-17 DIAGNOSIS — Z86.39 H/O VITAMIN D DEFICIENCY: ICD-10-CM

## 2019-10-17 DIAGNOSIS — Z71.3 WEIGHT LOSS COUNSELING, ENCOUNTER FOR: ICD-10-CM

## 2019-10-17 DIAGNOSIS — E66.9 OBESITY (BMI 30-39.9): ICD-10-CM

## 2019-11-06 ENCOUNTER — CLINICAL SUPPORT (OUTPATIENT)
Dept: SURGERY | Age: 44
End: 2019-11-06

## 2019-11-06 DIAGNOSIS — Z71.3 WEIGHT LOSS COUNSELING, ENCOUNTER FOR: Primary | ICD-10-CM

## 2019-11-07 VITALS
WEIGHT: 253 LBS | BODY MASS INDEX: 37.47 KG/M2 | SYSTOLIC BLOOD PRESSURE: 141 MMHG | DIASTOLIC BLOOD PRESSURE: 84 MMHG | HEIGHT: 69 IN | HEART RATE: 110 BPM

## 2019-12-09 ENCOUNTER — TELEPHONE (OUTPATIENT)
Dept: SURGERY | Age: 44
End: 2019-12-09

## 2019-12-09 NOTE — TELEPHONE ENCOUNTER
LVM to notify patient to have labs done before appt with Savannah Salazar NP on 12/12/19. Encounter will b e closed.

## 2019-12-12 ENCOUNTER — HOSPITAL ENCOUNTER (OUTPATIENT)
Dept: LAB | Age: 44
Discharge: HOME | End: 2019-12-12

## 2019-12-12 ENCOUNTER — OFFICE VISIT (OUTPATIENT)
Dept: SURGERY | Age: 44
End: 2019-12-12

## 2019-12-12 VITALS
WEIGHT: 256 LBS | HEIGHT: 69 IN | TEMPERATURE: 96.5 F | BODY MASS INDEX: 37.92 KG/M2 | HEART RATE: 76 BPM | OXYGEN SATURATION: 96 % | DIASTOLIC BLOOD PRESSURE: 80 MMHG | SYSTOLIC BLOOD PRESSURE: 111 MMHG

## 2019-12-12 DIAGNOSIS — E66.09 OBESITY DUE TO EXCESS CALORIES, UNSPECIFIED CLASSIFICATION, UNSPECIFIED WHETHER SERIOUS COMORBIDITY PRESENT: ICD-10-CM

## 2019-12-12 DIAGNOSIS — Z86.39 H/O VITAMIN D DEFICIENCY: ICD-10-CM

## 2019-12-12 DIAGNOSIS — E66.09 OBESITY DUE TO EXCESS CALORIES, UNSPECIFIED CLASSIFICATION, UNSPECIFIED WHETHER SERIOUS COMORBIDITY PRESENT: Primary | ICD-10-CM

## 2019-12-12 DIAGNOSIS — Z71.3 WEIGHT LOSS COUNSELING, ENCOUNTER FOR: ICD-10-CM

## 2019-12-12 LAB
ANION GAP SERPL CALC-SCNC: 6 MMOL/L (ref 3–18)
BASOPHILS # BLD: 0 K/UL (ref 0–0.1)
BASOPHILS NFR BLD: 0 % (ref 0–2)
BUN SERPL-MCNC: 20 MG/DL (ref 7–18)
BUN/CREAT SERPL: 25 (ref 12–20)
CALCIUM SERPL-MCNC: 9.4 MG/DL (ref 8.5–10.1)
CHLORIDE SERPL-SCNC: 105 MMOL/L (ref 100–111)
CO2 SERPL-SCNC: 29 MMOL/L (ref 21–32)
CREAT SERPL-MCNC: 0.8 MG/DL (ref 0.6–1.3)
DIFFERENTIAL METHOD BLD: NORMAL
EOSINOPHIL # BLD: 0.1 K/UL (ref 0–0.4)
EOSINOPHIL NFR BLD: 1 % (ref 0–5)
ERYTHROCYTE [DISTWIDTH] IN BLOOD BY AUTOMATED COUNT: 13.2 % (ref 11.6–14.5)
GLUCOSE SERPL-MCNC: 95 MG/DL (ref 74–99)
HCT VFR BLD AUTO: 43 % (ref 35–45)
HGB BLD-MCNC: 13.9 G/DL (ref 12–16)
LYMPHOCYTES # BLD: 2.1 K/UL (ref 0.9–3.6)
LYMPHOCYTES NFR BLD: 30 % (ref 21–52)
MCH RBC QN AUTO: 29.1 PG (ref 24–34)
MCHC RBC AUTO-ENTMCNC: 32.3 G/DL (ref 31–37)
MCV RBC AUTO: 90.1 FL (ref 74–97)
MONOCYTES # BLD: 0.6 K/UL (ref 0.05–1.2)
MONOCYTES NFR BLD: 9 % (ref 3–10)
NEUTS SEG # BLD: 4.3 K/UL (ref 1.8–8)
NEUTS SEG NFR BLD: 60 % (ref 40–73)
PLATELET # BLD AUTO: 343 K/UL (ref 135–420)
PMV BLD AUTO: 9.6 FL (ref 9.2–11.8)
POTASSIUM SERPL-SCNC: 4.4 MMOL/L (ref 3.5–5.5)
RBC # BLD AUTO: 4.77 M/UL (ref 4.2–5.3)
SENTARA SPECIMEN COL,SENBCF: NORMAL
SODIUM SERPL-SCNC: 140 MMOL/L (ref 136–145)
WBC # BLD AUTO: 7.1 K/UL (ref 4.6–13.2)

## 2019-12-12 PROCEDURE — 85025 COMPLETE CBC W/AUTO DIFF WBC: CPT

## 2019-12-12 PROCEDURE — 80048 BASIC METABOLIC PNL TOTAL CA: CPT

## 2019-12-12 PROCEDURE — 99001 SPECIMEN HANDLING PT-LAB: CPT

## 2019-12-12 RX ORDER — METAPROTERENOL SULFATE 20 MG
TABLET ORAL
COMMUNITY

## 2019-12-12 RX ORDER — PHENTERMINE HYDROCHLORIDE 37.5 MG/1
37.5 TABLET ORAL
Qty: 30 TAB | Refills: 0 | Status: SHIPPED | OUTPATIENT
Start: 2019-12-12 | End: 2020-05-11 | Stop reason: DRUGHIGH

## 2019-12-12 NOTE — PATIENT INSTRUCTIONS
If you have any questions or concerns about today's appointment, the verbal and/or written instructions you were given for follow up care, please call our office at [phone].     [facility] - [address]    [phone] office  [phone]

## 2019-12-12 NOTE — PROGRESS NOTES
Nursing Note for Medical Monitoring in the [facility]      Redd Kay is a 45 y.o. female who is enrolled in [facility]    Redd Kay was prescribed the VLCD / LCD. Did you have any problems adhering to the program last week? yes  If yes, please explain: Hard during thanksgiving. Since your last visit, have you experienced any complications? no    Have you received any other medical care this week? no  If yes, where and for what? Have you had any change in your medications since your last visit? no  If yes what? Are you taking an appetite suppressant? yes   If yes:  Do you need a refill? BP Readings from Last 3 Encounters:   11/07/19 141/84   10/10/19 107/64   09/25/19 124/78        Eating Habits Over Last Week:  Did you take in 64 oz of non-caloric fluids? yes     Did you consume your prescribed meal replacement regimen each day?  yes       Physical Activity Over the Past Week:     Aerobic exercise: 90 min  Resistance exercise: 90 workouts / week

## 2019-12-12 NOTE — PROGRESS NOTES
[facility] Progress Note:   F/up Provider Visit     CC: Weight Management      Miriam De Oliveira is a 45 y.o. female who is here for her  f/up medical provider visit for the LCD Program.    Weight History    Ideal body weight: 66.2 kg (145 lb 15.1 oz)  Adjusted ideal body weight: 86.2 kg (189 lb 15.5 oz) (Based on [employer] Tables)  Goal weight: 175 lbs    Wt Readings from Last 10 Encounters:   12/12/19 116.1 kg (256 lb)   11/07/19 114.8 kg (253 lb)   10/10/19 116.3 kg (256 lb 4.8 oz)   09/26/19 117.5 kg (259 lb)   09/13/19 117.4 kg (258 lb 14.4 oz)   08/22/19 120.7 kg (266 lb)   04/25/19 110.3 kg (243 lb 1.6 oz)   03/15/19 112 kg (247 lb)   02/08/19 108.3 kg (238 lb 12.8 oz)   02/01/19 111.9 kg (246 lb 11.2 oz)       Weight Metrics 12/12/2019 12/12/2019 11/7/2019 11/6/2019 10/10/2019 10/10/2019 9/25/2019   Weight - 256 lb - 253 lb - 256 lb 4.8 oz 259 lb   Waist Measure Inches 41 - 39 - 42 - -   Exercise Mins/week - - - - - - -   Body Fat % - - - - - - -   BMI - 37.8 kg/m2 - 37.36 kg/m2 - 37.85 kg/m2 38.25 kg/m2       History    Past Medical History:   Diagnosis Date    GERD (gastroesophageal reflux disease)        History reviewed. No pertinent surgical history. Current Outpatient Medications   Medication Sig Dispense Refill    Rswhucfu-Ufor-Mtmlih-Hyalur Ac 881-659-20-2 mg cap Take  by mouth.  phentermine (ADIPEX-P) 37.5 mg tablet Take 1 Tab by mouth every morning. Max Daily Amount: 37.5 mg. 30 Tab 0    phentermine (ADIPEX-P) 37.5 mg tablet Take 1 Tab by mouth every morning. Max Daily Amount: 37.5 mg. Indications: Weight Loss Management for an Obese Person 30 Tab 0    cholecalciferol (VITAMIN D3) (1000 Units /25 mcg) tablet Take  by mouth daily.  fish oil-dha-epa 1,200-144-216 mg cap Take  by mouth.  Lactobacillus acidophilus (PROBIOTIC PO) Take  by mouth.  MULTIVITAMINS WITH FLUORIDE (MULTI-VITAMIN PO) Take  by mouth.       OTHER       turmeric root extract 500 mg cap Take  by mouth. No Known Allergies    Social History     Tobacco Use    Smoking status: Never Smoker    Smokeless tobacco: Never Used   Substance Use Topics    Alcohol use: Not Currently     Comment: Occaisionally    Drug use: No       Family History   Problem Relation Age of Onset    Diabetes Mother     Hypertension Mother    [facility] Elevated Lipids Mother     Glaucoma Mother     Cataract Mother     Diabetes Father     Hypertension Father     Elevated Lipids Father        Family Status   Relation Name Status    Mother  (Not Specified)    Father  (Not Specified)           Review of Systems  Positives in [place]    Constitutional:  Unexpected weight gain/loss, night sweats,fatigue/maliase/lethargy, change in sleep, fever, rash  Eyes:  Visual changes, headaches, eye pain, floaters  ENT:  Rhinorrhea, epistaxis, sinus pain, change in hearing, gingival bleeding, sore throat, dysphagia, dysphonia  CV:  Chest pain, shortness of breath, difficulty breathing, orthopnea, palpitations, loss of consciousness, claudication  Resp: Cough, wheeze, haemoptysis, sob, exercise intolerence  GI:  Abdominal pain, dysphagia, reflux, bloating, cramping. Obstipation, haematemesis, brbpr, hematochezia,dark tarry stools. Nausea, vomitting, diarrhea, constipation. : Change in frequency of urination, dysuria, hematuria, change in force of Stream  Neuro: Paresthesias, numbness, weakness, changes in balance, changes in speech, loss of control of bowel or bladder,   Psych:Changes in depression, anxiety, sleep patterns, change in energy Levels  Endocrine: Temperature intolerance, dry skin, hair loss, fatigue,  Episodes of hypoglycemia, changes in libido  Heme: Anemia, pupura, petechia  Skin: Rashes, itchiness, excessive bruising    Remainder of ROS as per HPI. Since your last visit, have you experienced any complications? no  If yes, please list:     Are you taking an appetite suppressant? no  If so:  Do you need a refill? yes  Are you experiencine any Chest Pain, Palpitations or Dizziness? no    BP Readings from Last 3 Encounters:   12/12/19 111/80   11/07/19 141/84   10/10/19 107/64           Have you received any other medical care this week? yes  If yes, where and for what? Have you discontinued or changed any medicine or dose of your medicine(s) since your last visit with [facility] provider? no    If yes, where and for what? Diet  How many ounces of calorie-free liquids did you consume each day?  1 gallon per day    How many meal replacements did you take each day? 3MR and 1 prepped meal    Did you have any problems adhering to the program?  yes  If yes, please explain: During the holidays       Exercise  Aerobic exercise: 30 min of aerobic exercise   Resistance exercise: 30 workouts 2 times per week  Any discomfort?  no     If yes, where? Objective  Visit Vitals  /80 (BP 1 Location: Right arm, BP Patient Position: Sitting)   Pulse 76   Temp 96.5 °F (35.8 °C) (Oral)   Ht 5' 9\" (1.753 m)   Wt 116.1 kg (256 lb)   SpO2 96%   BMI 37.80 kg/m²     No LMP recorded. (Menstrual status: IUD). Physical Exam      General: AAOX3, pleasant and cooperative to exam. Appropriately groomed. NAD. Non-toxic in appearance. Appears stated age. HENT: NC/AT. PERRLA. Extraocular motions are intact. Sclera anicteric, Conjunctiva Clear. Nares clear. Oropharynx pink, moist without exudate or erythema. Uvula Midline. Neck:  Trachea is midline. No visible JVD, Lymphadenopathy. Chest: Good equal bilateral expansion  Lungs: Clear to auscultation bilaterally without e/o crackles, wheezes or rhales. Heart: RRR, S1 and S2 noted. No c/r/m/g/vpmi. Abdomen: obese, soft and non-tender without distension. Good bowel sounds. No vis/palp masses or pulsations. No organo-splenomegaly. No hernias to my exam. No e/o acute abdomen or peritoneal signs.    :  Deferred  Rectal: Deferred  Extremities:  No C/C/E  Neuro: CN II-XII appear to be grossly intact without focal deficit. Ambulatory. Skin: Clean, warm and dry. Assessment / Plan    Encounter Diagnoses   Name Primary?  Weight loss counseling, encounter for Yes    H/O vitamin D deficiency     Obesity due to excess calories, unspecified classification, unspecified whether serious comorbidity present        1. Weight management    Degree of control: Good   Progress was reviewed with patient after 15  weeks in the program.    Goal(s) for next appointment: Increase exercise to 45 min of cardio and strength training, 10 lb weight loss, monthly nutrition and provider visits. Number of meal replacements per day: 3 MR, 1 prepped meal     2. Labs    Latest results reviewed with patient   Routine monitoring labs ordered  Orders Placed This Encounter    METABOLIC PANEL, COMPREHENSIVE    VITAMIN D, 25 HYDROXY    URIC ACID    Fhybaojt-Ludm-Wdoavk-Hyalur Ac 069-069-30-2 mg cap    phentermine (ADIPEX-P) 37.5 mg tablet       3. Goals   See above    I have reviewed/discussed the above normal BMI with the patient. I have recommended the following interventions: dietary management education, guidance, and counseling, encourage exercise, monitor weight and prescribed dietary intake .             >50% of 25 min visit spent counseling     MIKKI Sigala-BC

## 2020-01-31 ENCOUNTER — OFFICE VISIT (OUTPATIENT)
Dept: SURGERY | Age: 45
End: 2020-01-31

## 2020-01-31 VITALS
BODY MASS INDEX: 37.47 KG/M2 | OXYGEN SATURATION: 94 % | HEIGHT: 69 IN | SYSTOLIC BLOOD PRESSURE: 123 MMHG | TEMPERATURE: 97.4 F | DIASTOLIC BLOOD PRESSURE: 84 MMHG | WEIGHT: 253 LBS | HEART RATE: 85 BPM

## 2020-01-31 DIAGNOSIS — Z71.3 WEIGHT LOSS COUNSELING, ENCOUNTER FOR: ICD-10-CM

## 2020-01-31 DIAGNOSIS — E66.9 OBESITY (BMI 30-39.9): ICD-10-CM

## 2020-01-31 DIAGNOSIS — Z86.39 H/O VITAMIN D DEFICIENCY: ICD-10-CM

## 2020-01-31 DIAGNOSIS — E66.9 OBESITY (BMI 30-39.9): Primary | ICD-10-CM

## 2020-01-31 NOTE — PROGRESS NOTES
Nursing Note for Medical Monitoring in the [facility]      Megan West is a 45 y.o. female who is enrolled in [facility]    Megan West was prescribed the  LCD. Did you have any problems adhering to the program last week? yes  If yes, please explain:     Since your last visit, have you experienced any complications? no    Have you received any other medical care this week? no  If yes, where and for what? Have you had any change in your medications since your last visit? no  If yes what? Are you taking an appetite suppressant? yes   If yes:  Do you need a refill? BP Readings from Last 3 Encounters:   12/12/19 111/80   11/07/19 141/84   10/10/19 107/64        Eating Habits Over Last Week:  Did you take in 64 oz of non-caloric fluids? yes     Did you consume your prescribed meal replacement regimen each day?  yes       Physical Activity Over the Past Week:    Aerobic exercise: 0 min  Resistance exercise: 0 workouts / week

## 2020-01-31 NOTE — PATIENT INSTRUCTIONS
If you have any questions or concerns about today's appointment, the verbal and/or written instructions you were given for follow up care, please call our office at [phone]. [facility] - [address], [address] 
 
[phone] office [phone]

## 2020-01-31 NOTE — PROGRESS NOTES
[facility] Progress Note:   F/up Provider Visit     CC: Weight Management      Lilian Caballero is a 45 y.o. female who is here for her  f/up medical provider visit for the VL Program.    Weight History    Ideal body weight: 66.2 kg (145 lb 15.1 oz)  Adjusted ideal body weight: 85.6 kg (188 lb 12.3 oz) (Based on [employer] Tables)  Goal Weight: 180 lbs     Wt Readings from Last 10 Encounters:   01/31/20 114.8 kg (253 lb)   12/12/19 116.1 kg (256 lb)   11/07/19 114.8 kg (253 lb)   10/10/19 116.3 kg (256 lb 4.8 oz)   09/26/19 117.5 kg (259 lb)   09/13/19 117.4 kg (258 lb 14.4 oz)   08/22/19 120.7 kg (266 lb)   04/25/19 110.3 kg (243 lb 1.6 oz)   03/15/19 112 kg (247 lb)   02/08/19 108.3 kg (238 lb 12.8 oz)       Weight Metrics 1/31/2020 1/31/2020 12/12/2019 12/12/2019 11/7/2019 11/6/2019 10/10/2019   Weight - 253 lb - 256 lb - 253 lb -   Waist Measure Inches 39 - 41 - 39 - 42   Exercise Mins/week - - - - - - -   Body Fat % - - - - - - -   BMI - 37.36 kg/m2 - 37.8 kg/m2 - 37.36 kg/m2 -             History    Past Medical History:   Diagnosis Date    GERD (gastroesophageal reflux disease)        History reviewed. No pertinent surgical history. Current Outpatient Medications   Medication Sig Dispense Refill    Mbypfrhn-Otmg-Iffboi-Hyalur Ac 627-320-14-2 mg cap Take  by mouth.  phentermine (ADIPEX-P) 37.5 mg tablet Take 1 Tab by mouth every morning. Max Daily Amount: 37.5 mg. Indications: Weight Loss Management for an Obese Person 30 Tab 0    cholecalciferol (VITAMIN D3) (1000 Units /25 mcg) tablet Take  by mouth daily.  fish oil-dha-epa 1,200-144-216 mg cap Take  by mouth.  Lactobacillus acidophilus (PROBIOTIC PO) Take  by mouth.  MULTIVITAMINS WITH FLUORIDE (MULTI-VITAMIN PO) Take  by mouth.  phentermine (ADIPEX-P) 37.5 mg tablet Take 1 Tab by mouth every morning.  Max Daily Amount: 37.5 mg. 30 Tab 0    OTHER       turmeric root extract 500 mg cap Take  by mouth. No Known Allergies    Social History     Tobacco Use    Smoking status: Never Smoker    Smokeless tobacco: Never Used   Substance Use Topics    Alcohol use: Not Currently     Comment: Occaisionally    Drug use: No       Family History   Problem Relation Age of Onset    Diabetes Mother     Hypertension Mother    [facility] Elevated Lipids Mother     Glaucoma Mother     Cataract Mother     Diabetes Father     Hypertension Father     Elevated Lipids Father        Family Status   Relation Name Status    Mother  (Not Specified)    Father  (Not Specified)           Review of Systems  Positives in [place]    Constitutional:  Unexpected weight gain/loss, night sweats,fatigue/maliase/lethargy, change in sleep, fever, rash  Eyes:  Visual changes, headaches, eye pain, floaters  ENT:  Rhinorrhea, epistaxis, sinus pain, change in hearing, gingival bleeding, sore throat, dysphagia, dysphonia  CV:  Chest pain, shortness of breath, difficulty breathing, orthopnea, palpitations, loss of consciousness, claudication  Resp: Cough, wheeze, haemoptysis, sob, exercise intolerence  GI:  Abdominal pain, dysphagia, reflux, bloating, cramping. Obstipation, haematemesis, brbpr, hematochezia,dark tarry stools. Nausea, vomitting, diarrhea, constipation. : Change in frequency of urination, dysuria, hematuria, change in force of Stream  Neuro: Paresthesias, numbness, weakness, changes in balance, changes in speech, loss of control of bowel or bladder,   Psych:Changes in depression, anxiety, sleep patterns, change in energy Levels  Endocrine: Temperature intolerance, dry skin, hair loss, fatigue,  Episodes of hypoglycemia, changes in libido  Heme: Anemia, pupura, petechia  Skin: Rashes, itchiness, excessive bruising    Remainder of ROS as per HPI. Since your last visit, have you experienced any complications? no  If yes, please list:     Are you taking an appetite suppressant?  yes  If so:  Do you need a refill? no  Are you experiencine any Chest Pain, Palpitations or Dizziness? no    BP Readings from Last 3 Encounters:   01/31/20 123/84   12/12/19 111/80   11/07/19 141/84           Have you received any other medical care this week? no  If yes, where and for what? Have you discontinued or changed any medicine or dose of your medicine(s) since your last visit with [facility] provider? no    If yes, where and for what? Diet  How many ounces of calorie-free liquids did you consume each day? 128 oz    How many meal replacements did you take each day? 2-3 MR and 1 to 2 meals    Did you have any problems adhering to the program?  yes   If yes, please explain: During the holiday and a trip to [place]. Exercise  Aerobic exercise: 30 min walk per day  Resistance exercise:  workouts / week  Any discomfort?  no     If yes, where? Objective  Visit Vitals  /84 (BP 1 Location: Right arm, BP Patient Position: Sitting)   Pulse 85   Temp 97.4 °F (36.3 °C) (Oral)   Ht 5' 9\" (1.753 m)   Wt 114.8 kg (253 lb)   SpO2 94%   BMI 37.36 kg/m²     No LMP recorded. (Menstrual status: IUD). Physical Exam      General: AAOX3, pleasant and cooperative to exam. Appropriately groomed. NAD. Non-toxic in appearance. Appears stated age. HENT: NC/AT. PERRLA. Extraocular motions are intact. Sclera anicteric, Conjunctiva Clear. Nares clear. Oropharynx pink, moist without exudate or erythema. Uvula Midline. Neck:  Trachea is midline. No visible JVD, Lymphadenopathy. Chest: Good equal bilateral expansion  Lungs: Clear to auscultation bilaterally without e/o crackles, wheezes or rhales. Heart: RRR, S1 and S2 noted. No c/r/m/g/vpmi. Abdomen: obese, soft and non-tender without distension. Good bowel sounds. No vis/palp masses or pulsations. No organo-splenomegaly. No hernias to my exam. No e/o acute abdomen or peritoneal signs.    :  Deferred  Rectal: Deferred  Extremities:  No C/C/E  Neuro: CN II-XII appear to be grossly intact without focal deficit. Ambulatory. Skin: Clean, warm and dry. Assessment / Plan    Encounter Diagnoses   Name Primary?  Obesity (BMI 30-39. 9) Yes    Weight loss counseling, encounter for     H/O vitamin D deficiency        1. Weight management    Degree of control: Fair    Progress was reviewed with patient   Goal(s) for next appointment: -15 to 20 lbs, continue VLCD, Increase cardio, monthly provider visit with labs, weekly nutrition class   Number of meal replacements per day: 4 MR          2. Labs    Latest results reviewed with patient   Routine monitoring labs ordered  Orders Placed This Encounter    METABOLIC PANEL, COMPREHENSIVE    URIC ACID       3. Goals   -15-20     I have reviewed/discussed the above normal BMI with the patient. I have recommended the following interventions: dietary management education, guidance, and counseling, encourage exercise, monitor weight and prescribed dietary intake .         >50% of 25 min visit spent counseling     MIKKI Briscoe-BC

## 2020-02-07 ENCOUNTER — HOSPITAL ENCOUNTER (OUTPATIENT)
Dept: LAB | Age: 45
Discharge: HOME | End: 2020-02-07

## 2020-02-07 LAB — SENTARA SPECIMEN COL,SENBCF: NORMAL

## 2020-02-07 PROCEDURE — 99001 SPECIMEN HANDLING PT-LAB: CPT

## 2020-02-17 ENCOUNTER — TELEPHONE (OUTPATIENT)
Dept: SURGERY | Age: 45
End: 2020-02-17

## 2020-02-17 NOTE — TELEPHONE ENCOUNTER
Left message for patient to return phone call regarding medication refill. She appears to be scheduled to see this provider 2/21/2020.

## 2020-02-21 DIAGNOSIS — E66.9 OBESITY (BMI 30-39.9): Primary | ICD-10-CM

## 2020-02-21 DIAGNOSIS — Z71.3 WEIGHT LOSS COUNSELING, ENCOUNTER FOR: ICD-10-CM

## 2020-02-21 RX ORDER — PHENTERMINE HYDROCHLORIDE 37.5 MG/1
37.5 TABLET ORAL
Qty: 30 TAB | Refills: 0 | Status: SHIPPED | OUTPATIENT
Start: 2020-02-21 | End: 2020-05-11 | Stop reason: DRUGHIGH

## 2020-02-26 ENCOUNTER — OFFICE VISIT (OUTPATIENT)
Dept: SURGERY | Age: 45
End: 2020-02-26

## 2020-02-26 DIAGNOSIS — Z71.3 WEIGHT LOSS COUNSELING, ENCOUNTER FOR: Primary | ICD-10-CM

## 2020-02-27 VITALS
WEIGHT: 256 LBS | DIASTOLIC BLOOD PRESSURE: 84 MMHG | BODY MASS INDEX: 37.92 KG/M2 | HEART RATE: 95 BPM | SYSTOLIC BLOOD PRESSURE: 121 MMHG | HEIGHT: 69 IN

## 2020-03-10 ENCOUNTER — TELEPHONE (OUTPATIENT)
Dept: SURGERY | Age: 45
End: 2020-03-10

## 2020-03-10 DIAGNOSIS — Z71.3 WEIGHT LOSS COUNSELING, ENCOUNTER FOR: Primary | ICD-10-CM

## 2020-03-11 ENCOUNTER — OFFICE VISIT (OUTPATIENT)
Dept: SURGERY | Age: 45
End: 2020-03-11

## 2020-03-11 VITALS
HEART RATE: 99 BPM | DIASTOLIC BLOOD PRESSURE: 88 MMHG | BODY MASS INDEX: 37.33 KG/M2 | RESPIRATION RATE: 20 BRPM | SYSTOLIC BLOOD PRESSURE: 139 MMHG | TEMPERATURE: 98.1 F | WEIGHT: 252 LBS | HEIGHT: 69 IN

## 2020-03-11 DIAGNOSIS — Z86.39 H/O VITAMIN D DEFICIENCY: ICD-10-CM

## 2020-03-11 DIAGNOSIS — E66.9 OBESITY (BMI 30-39.9): ICD-10-CM

## 2020-03-11 DIAGNOSIS — Z71.3 WEIGHT LOSS COUNSELING, ENCOUNTER FOR: ICD-10-CM

## 2020-03-11 DIAGNOSIS — E66.9 OBESITY (BMI 30-39.9): Primary | ICD-10-CM

## 2020-03-11 NOTE — PROGRESS NOTES
[facility] Progress Note:   F/up Provider Visit     CC: Weight Management      Jean Marley is a 45 y.o. female who is here for her  f/up medical provider visit for the LCD Program.     Weight History    Ideal body weight: 66.2 kg (145 lb 15.1 oz)  Adjusted ideal body weight: 85.4 kg (188 lb 5.9 oz) (Based on [employer] Tables)  Goal weight: 185 lbs     Wt Readings from Last 10 Encounters:   03/11/20 114.3 kg (252 lb)   02/27/20 116.1 kg (256 lb)   01/31/20 114.8 kg (253 lb)   12/12/19 116.1 kg (256 lb)   11/07/19 114.8 kg (253 lb)   10/10/19 116.3 kg (256 lb 4.8 oz)   09/26/19 117.5 kg (259 lb)   09/13/19 117.4 kg (258 lb 14.4 oz)   08/22/19 120.7 kg (266 lb)   04/25/19 110.3 kg (243 lb 1.6 oz)       Weight Metrics 3/11/2020 3/11/2020 2/27/2020 2/26/2020 1/31/2020 1/31/2020 12/12/2019   Weight - 252 lb - 256 lb - 253 lb -   Waist Measure Inches 38 - 39 - 39 - 41   Exercise Mins/week - - - - - - -   Body Fat % - - - - - - -   BMI - 37.21 kg/m2 - 37.8 kg/m2 - 37.36 kg/m2 -             History    Past Medical History:   Diagnosis Date    GERD (gastroesophageal reflux disease)        History reviewed. No pertinent surgical history. Current Outpatient Medications   Medication Sig Dispense Refill    Mdtewzgl-Giqq-Zvumuv-Hyalur Ac 566-195-19-2 mg cap Take  by mouth.  phentermine (ADIPEX-P) 37.5 mg tablet Take 1 Tab by mouth every morning. Max Daily Amount: 37.5 mg. Indications: Weight Loss Management for an Obese Person 30 Tab 0    cholecalciferol (VITAMIN D3) (1000 Units /25 mcg) tablet Take  by mouth daily.  OTHER       fish oil-dha-epa 1,200-144-216 mg cap Take  by mouth.  turmeric root extract 500 mg cap Take  by mouth.  Lactobacillus acidophilus (PROBIOTIC PO) Take  by mouth.  MULTIVITAMINS WITH FLUORIDE (MULTI-VITAMIN PO) Take  by mouth.  phentermine (ADIPEX-P) 37.5 mg tablet Take 1 Tab by mouth every morning.  Max Daily Amount: 37.5 mg. 30 Tab 0    phentermine (ADIPEX-P) 37.5 mg tablet Take 1 Tab by mouth every morning. Max Daily Amount: 37.5 mg. 30 Tab 0       No Known Allergies    Social History     Tobacco Use    Smoking status: Never Smoker    Smokeless tobacco: Never Used   Substance Use Topics    Alcohol use: Not Currently     Comment: Occaisionally    Drug use: No       Family History   Problem Relation Age of Onset    Diabetes Mother     Hypertension Mother    [facility] Elevated Lipids Mother     Glaucoma Mother     Cataract Mother     Diabetes Father     Hypertension Father     Elevated Lipids Father        Family Status   Relation Name Status    Mother  (Not Specified)    Father  (Not Specified)           Review of Systems  Positives in [place]    Constitutional:  Unexpected weight gain/loss, night sweats,fatigue/maliase/lethargy, change in sleep, fever, rash  Eyes:  Visual changes, headaches, eye pain, floaters  ENT:  Rhinorrhea, epistaxis, sinus pain, change in hearing, gingival bleeding, sore throat, dysphagia, dysphonia  CV:  Chest pain, shortness of breath, difficulty breathing, orthopnea, palpitations, loss of consciousness, claudication  Resp: Cough, wheeze, haemoptysis, sob, exercise intolerence  GI:  Abdominal pain, dysphagia, reflux, bloating, cramping. Obstipation, haematemesis, brbpr, hematochezia,dark tarry stools. Nausea, vomitting, diarrhea, constipation. : Change in frequency of urination, dysuria, hematuria, change in force of Stream  Neuro: Paresthesias, numbness, weakness, changes in balance, changes in speech, loss of control of bowel or bladder,   Psych:Changes in depression, anxiety, sleep patterns, change in energy Levels  Endocrine: Temperature intolerance, dry skin, hair loss, fatigue,  Episodes of hypoglycemia, changes in libido  Heme: Anemia, pupura, petechia  Skin: Rashes, itchiness, excessive bruising    Remainder of ROS as per HPI.       Since your last visit, have you experienced any complications? no  If yes, please list:     Are you taking an appetite suppressant? yes  If so:  Do you need a refill? no  Are you experiencine any Chest Pain, Palpitations or Dizziness? no    BP Readings from Last 3 Encounters:   03/11/20 139/88   02/27/20 121/84   01/31/20 123/84           Have you received any other medical care this week? no  If yes, where and for what? Have you discontinued or changed any medicine or dose of your medicine(s) since your last visit with [facility] provider? no    If yes, where and for what? Diet  How many ounces of calorie-free liquids did you consume each day? 64 oz    How many meal replacements did you take each day? 3 MR and 1 prepared meal    Did you have any problems adhering to the program?  no   If yes, please explain:      Exercise  Aerobic exercise: 0 min  Resistance exercise: 0 workouts / week  Any discomfort?  no     If yes, where? Objective  Visit Vitals  /88 (BP 1 Location: Left arm, BP Patient Position: At rest)   Pulse 99   Temp 98.1 °F (36.7 °C) (Oral)   Resp 20   Ht 5' 9\" (1.753 m)   Wt 114.3 kg (252 lb)   BMI 37.21 kg/m²     No LMP recorded. (Menstrual status: IUD). Physical Exam      General: AAOX3, pleasant and cooperative to exam. Appropriately groomed. NAD. Non-toxic in appearance. Appears stated age. HENT: NC/AT. PERRLA. Extraocular motions are intact. Sclera anicteric, Conjunctiva Clear. Nares clear. Oropharynx pink, moist without exudate or erythema. Uvula Midline. Neck:  Trachea is midline. No visible JVD, Lymphadenopathy. Chest: Good equal bilateral expansion  Lungs: Clear to auscultation bilaterally without e/o crackles, wheezes or rhales. Heart: RRR, S1 and S2 noted. No c/r/m/g/vpmi. Abdomen: obese, soft and non-tender without distension. Good bowel sounds. No vis/palp masses or pulsations. No organo-splenomegaly. No hernias to my exam. No e/o acute abdomen or peritoneal signs.    :  Deferred  Rectal: Deferred  Extremities:  No C/C/E  Neuro: CN II-XII appear to be grossly intact without focal deficit. Ambulatory. Skin: Clean, warm and dry. Assessment / Plan    Encounter Diagnoses   Name Primary?  Obesity (BMI 30-39. 9) Yes    Weight loss counseling, encounter for     H/O vitamin D deficiency        1. Weight management    Degree of control: Need to remain persistent with meal replacements. Progress was reviewed with patient after almost one year in the program.    Goal(s) for next appointment: Remain persistent, continue LCD, monthly provider visit with labs   Number of meal replacements per day: 3 MR and 1 prepped meal.      2.  Labs    Latest results reviewed with patient   Routine monitoring labs ordered  Orders Placed This Encounter    URIC ACID    METABOLIC PANEL, COMPREHENSIVE       3. Goals   -10 to 15 lbs    I have reviewed/discussed the above normal BMI with the patient. I have recommended the following interventions: dietary management education, guidance, and counseling, encourage exercise, monitor weight and prescribed dietary intake .         >50% of 25 min visit spent counseling     MIKKI Falk-BC

## 2020-05-07 ENCOUNTER — VIRTUAL VISIT (OUTPATIENT)
Dept: SURGERY | Age: 45
End: 2020-05-07

## 2020-05-07 VITALS — HEIGHT: 69 IN | WEIGHT: 256 LBS | BODY MASS INDEX: 37.92 KG/M2

## 2020-05-07 DIAGNOSIS — Z71.3 WEIGHT LOSS COUNSELING, ENCOUNTER FOR: ICD-10-CM

## 2020-05-07 DIAGNOSIS — Z86.39 H/O VITAMIN D DEFICIENCY: ICD-10-CM

## 2020-05-07 DIAGNOSIS — E66.9 OBESITY (BMI 30-39.9): Primary | ICD-10-CM

## 2020-05-07 NOTE — PROGRESS NOTES
Carson Osorio is a 45 y.o. female who presents today with   Chief Complaint   Patient presents with    Morbid Obesity     MWL    Pt is currently doing 3 meal replacements a day. Dinner: meat and a green vegetable. Pt is exercising 4 days a week. Pt is drinking about a gallon of water daily. Body mass index is 37.8 kg/m².

## 2020-05-07 NOTE — PROGRESS NOTES
Consent:  Charles Alisia Rojas  and/or her healthcare decision maker is aware that this patient-initiated Telehealth encounter is a billable service, with coverage as determined by her insurance carrier. She is aware that she may receive a bill and has provided verbal consent to proceed: Yes    Provider location while conducting visit: in the office  Patient location: Home  Other person participating in the telehealth services: RENEE Middleton   This virtual visit was conducted via interactive/real-time audio/video using [email or web address]   Visit start: 1991 Visit End:1000     Weight Loss Program Progress Note:       CC: Weight Management  Ben Ricks is a 45 y.o. female who is here for her  f/up medical provider visit for the LCD Program who was seen by synchronous (real-time) audio-video technology on 5/7/2020. Ab challenge, 14/30 day program, feels pants are loose. Is not getting on the scale. Has been out of phentermine, has noticed the cravings when off of it. Weight History  Weight Metrics 5/7/2020 3/11/2020 3/11/2020 2/27/2020 2/26/2020 1/31/2020 1/31/2020   Weight 256 lb - 252 lb - 256 lb - 253 lb   Waist Measure Inches - 38 - 39 - 39 -   Exercise Mins/week - - - - - - -   Body Fat % - - - - - - -   BMI 37.8 kg/m2 - 37.21 kg/m2 - 37.8 kg/m2 - 37.36 kg/m2     Goal wt: 185lbs   Ideal body weight: 66.2 kg (145 lb 15.1 oz)  Adjusted ideal body weight: 86.2 kg (189 lb 15.5 oz)  Body mass index is 37.8 kg/m². History  Past Medical History:   Diagnosis Date    GERD (gastroesophageal reflux disease)        No past surgical history on file. Current Outpatient Medications   Medication Sig Dispense Refill    phentermine (ADIPEX-P) 37.5 mg tablet Take 1 Tab by mouth every morning. Max Daily Amount: 37.5 mg. Indications: Weight Loss Management for an Obese Person 30 Tab 0    cholecalciferol (VITAMIN D3) (1000 Units /25 mcg) tablet Take  by mouth daily.       Lactobacillus acidophilus (PROBIOTIC PO) Take  by mouth.  MULTIVITAMINS WITH FLUORIDE (MULTI-VITAMIN PO) Take  by mouth.  phentermine (ADIPEX-P) 37.5 mg tablet Take 1 Tab by mouth every morning. Max Daily Amount: 37.5 mg. 30 Tab 0    Xxakqffa-Nwzj-Ivljvw-Hyalur Ac 971-701-99-2 mg cap Take  by mouth.  phentermine (ADIPEX-P) 37.5 mg tablet Take 1 Tab by mouth every morning. Max Daily Amount: 37.5 mg. 30 Tab 0    OTHER       fish oil-dha-epa 1,200-144-216 mg cap Take  by mouth.  turmeric root extract 500 mg cap Take  by mouth. No Known Allergies    Social History     Tobacco Use    Smoking status: Never Smoker    Smokeless tobacco: Never Used   Substance Use Topics    Alcohol use: Not Currently     Comment: Occaisionally    Drug use: No       Family History   Problem Relation Age of Onset    Diabetes Mother     Hypertension Mother    [facility] Elevated Lipids Mother     Glaucoma Mother     Cataract Mother     Diabetes Father     Hypertension Father     Elevated Lipids Father        Family Status   Relation Name Status    Mother  (Not Specified)    Father  (Not Specified)     Medications:  Outpatient Medications Marked as Taking for the 5/7/20 encounter (Virtual Visit) with Eleuterio Bhatti NP   Medication Sig Dispense Refill    phentermine (ADIPEX-P) 37.5 mg tablet Take 1 Tab by mouth every morning. Max Daily Amount: 37.5 mg. Indications: Weight Loss Management for an Obese Person 30 Tab 0    cholecalciferol (VITAMIN D3) (1000 Units /25 mcg) tablet Take  by mouth daily.  Lactobacillus acidophilus (PROBIOTIC PO) Take  by mouth.  MULTIVITAMINS WITH FLUORIDE (MULTI-VITAMIN PO) Take  by mouth. Review of Systems  Review of Systems   All other systems reviewed and are negative. Objective  Visit Vitals  Ht 5' 9\" (1.753 m)   Wt 116.1 kg (256 lb)   BMI 37.80 kg/m²    (As obtained by patient/caregiver at home)  No LMP recorded. (Menstrual status: IUD).       Physical Exam  Physical Exam  Vitals signs and nursing note reviewed. Constitutional:       Appearance: Normal appearance. She is obese. HENT:      Head: Normocephalic and atraumatic. Neck:      Musculoskeletal: Normal range of motion. Pulmonary:      Effort: Pulmonary effort is normal.   Neurological:      Mental Status: She is alert and oriented to person, place, and time. Psychiatric:         Mood and Affect: Mood normal.         Behavior: Behavior normal.       Assessment / Plan    Encounter Diagnoses   Name Primary?  Obesity (BMI 30-39. 9) Yes    BMI 35.0-35.9,adult     Weight loss counseling, encounter for     H/O vitamin D deficiency        1. Weight management   Today, the patient is  Height: 5' 9\" (175.3 cm) tall, Weight: 116.1 kg (256 lb) lbs for a Body mass index is 37.8 kg/m². Degree of control: stable but cravings picking up   Progress was reviewed with patient. Goal(s) for next appointment:   -5lbs     Pt desires start phentermine--reviewed potential SE including: elevated HR, BP, increased anxiety, insomnia, constipation  Pt to start with 37.5mg tablet--1/2 tab early am--add 2nd half after 1st week if needs increased suppression, but no later than noon to avoid insomnia. Gave 30 tabs, no RF--pt understands will need monthly visits      I have reviewed/discussed the above normal BMI with the patient. I have recommended the following interventions: dietary management education, guidance, and counseling, encourage exercise, monitor weight and prescribed dietary intake . Sita Clark We discussed the expected course, resolution and complications of the diagnosis(es) in detail. Medication risks, benefits, costs, interactions, and alternatives were discussed as indicated. I advised her to contact the office if her condition worsens, changes or fails to improve as anticipated. She expressed understanding with the diagnosis(es) and plan.      Services were provided through a video synchronous discussion virtually to substitute for in-person clinic visit. Pursuant to the emergency declaration under the [facility], [facility] waiver authority and the [employer] and [employer] Act, this Virtual  Visit was conducted, with patient's consent, to reduce the patient's risk of exposure to COVID-19 and provide continuity of care for an established patient. Lupis Fuentes NP     Dragon medical dictation software was used for portions of this report. Unintended transcription errors may occur.

## 2020-05-11 ENCOUNTER — TELEPHONE (OUTPATIENT)
Dept: SURGERY | Age: 45
End: 2020-05-11

## 2020-05-11 DIAGNOSIS — Z71.3 WEIGHT LOSS COUNSELING, ENCOUNTER FOR: ICD-10-CM

## 2020-05-11 DIAGNOSIS — E66.9 OBESITY (BMI 30-39.9): ICD-10-CM

## 2020-05-11 RX ORDER — PHENTERMINE HYDROCHLORIDE 37.5 MG/1
37.5 TABLET ORAL
Qty: 30 TAB | Refills: 0 | Status: SHIPPED | OUTPATIENT
Start: 2020-05-11

## 2020-05-11 NOTE — TELEPHONE ENCOUNTER
Received call from patient via nurse VM line requesting refill of phentermine to [facility] on [address]. Patient called pharmacy after virtual visit with NP on 5/7/20; per pharmacy they did not receive a script.      Will notify NP.

## 2020-05-12 ENCOUNTER — TELEPHONE (OUTPATIENT)
Dept: SURGERY | Age: 45
End: 2020-05-12

## 2020-05-12 NOTE — TELEPHONE ENCOUNTER
Attempted to reach patient to notify her that NP called her pharmacy and spoke with the pharmacist to give phentermine order. There was no answer and VM box was full. Unable to leave VM.